# Patient Record
Sex: FEMALE | Race: WHITE | NOT HISPANIC OR LATINO | Employment: FULL TIME | URBAN - METROPOLITAN AREA
[De-identification: names, ages, dates, MRNs, and addresses within clinical notes are randomized per-mention and may not be internally consistent; named-entity substitution may affect disease eponyms.]

---

## 2017-01-01 ENCOUNTER — COMMUNICATION - HEALTHEAST (OUTPATIENT)
Dept: FAMILY MEDICINE | Facility: CLINIC | Age: 62
End: 2017-01-01

## 2017-01-01 DIAGNOSIS — Z88.9 MULTIPLE ALLERGIES: ICD-10-CM

## 2017-03-10 ENCOUNTER — OFFICE VISIT - HEALTHEAST (OUTPATIENT)
Dept: FAMILY MEDICINE | Facility: CLINIC | Age: 62
End: 2017-03-10

## 2017-03-10 DIAGNOSIS — R20.0 NUMBNESS: ICD-10-CM

## 2017-03-10 DIAGNOSIS — R04.0 LEFT-SIDED EPISTAXIS: ICD-10-CM

## 2017-03-10 DIAGNOSIS — M79.605 LEG PAIN, LEFT: ICD-10-CM

## 2017-04-08 ENCOUNTER — COMMUNICATION - HEALTHEAST (OUTPATIENT)
Dept: FAMILY MEDICINE | Facility: CLINIC | Age: 62
End: 2017-04-08

## 2017-04-08 DIAGNOSIS — Z88.9 MULTIPLE ALLERGIES: ICD-10-CM

## 2017-04-12 ENCOUNTER — COMMUNICATION - HEALTHEAST (OUTPATIENT)
Dept: FAMILY MEDICINE | Facility: CLINIC | Age: 62
End: 2017-04-12

## 2017-04-12 DIAGNOSIS — J45.909 ASTHMA: ICD-10-CM

## 2017-07-22 ENCOUNTER — COMMUNICATION - HEALTHEAST (OUTPATIENT)
Dept: FAMILY MEDICINE | Facility: CLINIC | Age: 62
End: 2017-07-22

## 2017-07-22 DIAGNOSIS — J45.909 ASTHMA: ICD-10-CM

## 2017-07-31 ENCOUNTER — RECORDS - HEALTHEAST (OUTPATIENT)
Dept: ADMINISTRATIVE | Facility: OTHER | Age: 62
End: 2017-07-31

## 2017-08-14 ENCOUNTER — COMMUNICATION - HEALTHEAST (OUTPATIENT)
Dept: FAMILY MEDICINE | Facility: CLINIC | Age: 62
End: 2017-08-14

## 2017-08-14 DIAGNOSIS — Z91.09 ENVIRONMENTAL ALLERGIES: ICD-10-CM

## 2017-09-24 ENCOUNTER — COMMUNICATION - HEALTHEAST (OUTPATIENT)
Dept: FAMILY MEDICINE | Facility: CLINIC | Age: 62
End: 2017-09-24

## 2017-09-24 DIAGNOSIS — J45.909 ASTHMA: ICD-10-CM

## 2017-09-30 ENCOUNTER — COMMUNICATION - HEALTHEAST (OUTPATIENT)
Dept: FAMILY MEDICINE | Facility: CLINIC | Age: 62
End: 2017-09-30

## 2017-09-30 DIAGNOSIS — Z88.9 MULTIPLE ALLERGIES: ICD-10-CM

## 2017-10-09 ENCOUNTER — COMMUNICATION - HEALTHEAST (OUTPATIENT)
Dept: FAMILY MEDICINE | Facility: CLINIC | Age: 62
End: 2017-10-09

## 2017-10-09 ENCOUNTER — HOSPITAL ENCOUNTER (OUTPATIENT)
Dept: MAMMOGRAPHY | Facility: CLINIC | Age: 62
Discharge: HOME | End: 2017-10-09
Attending: FAMILY MEDICINE

## 2017-10-09 DIAGNOSIS — Z12.31 VISIT FOR SCREENING MAMMOGRAM: ICD-10-CM

## 2017-11-10 ENCOUNTER — COMMUNICATION - HEALTHEAST (OUTPATIENT)
Dept: FAMILY MEDICINE | Facility: CLINIC | Age: 62
End: 2017-11-10

## 2017-11-10 DIAGNOSIS — Z91.09 ENVIRONMENTAL ALLERGIES: ICD-10-CM

## 2017-11-21 ENCOUNTER — COMMUNICATION - HEALTHEAST (OUTPATIENT)
Dept: FAMILY MEDICINE | Facility: CLINIC | Age: 62
End: 2017-11-21

## 2017-11-21 DIAGNOSIS — J45.909 ASTHMA: ICD-10-CM

## 2018-01-10 ENCOUNTER — OFFICE VISIT - HEALTHEAST (OUTPATIENT)
Dept: FAMILY MEDICINE | Facility: CLINIC | Age: 63
End: 2018-01-10

## 2018-01-10 DIAGNOSIS — J45.909 ASTHMA: ICD-10-CM

## 2018-01-10 DIAGNOSIS — R04.0 LEFT-SIDED EPISTAXIS: ICD-10-CM

## 2018-01-10 DIAGNOSIS — J31.0 RHINITIS: ICD-10-CM

## 2018-01-10 DIAGNOSIS — L40.9 PSORIASIS: ICD-10-CM

## 2018-01-10 DIAGNOSIS — Z00.00 PHYSICAL EXAM: ICD-10-CM

## 2018-01-10 DIAGNOSIS — E66.9 OBESITY: ICD-10-CM

## 2018-01-10 DIAGNOSIS — Z88.9 MULTIPLE ALLERGIES: ICD-10-CM

## 2018-01-10 DIAGNOSIS — Z91.09 ENVIRONMENTAL ALLERGIES: ICD-10-CM

## 2018-01-10 DIAGNOSIS — J45.30 MILD PERSISTENT ASTHMA: ICD-10-CM

## 2018-01-10 RX ORDER — CLOBETASOL PROPIONATE 0.5 MG/G
CREAM TOPICAL 2 TIMES DAILY
Status: SHIPPED | COMMUNITY
Start: 2018-01-10 | End: 2023-12-22

## 2018-01-10 RX ORDER — CALCIPOTRIENE 50 UG/G
CREAM TOPICAL 2 TIMES DAILY
Status: SHIPPED | COMMUNITY
Start: 2018-01-10 | End: 2023-12-22

## 2018-01-10 ASSESSMENT — MIFFLIN-ST. JEOR: SCORE: 1326.83

## 2018-02-15 ENCOUNTER — COMMUNICATION - HEALTHEAST (OUTPATIENT)
Dept: SCHEDULING | Facility: CLINIC | Age: 63
End: 2018-02-15

## 2018-02-15 ENCOUNTER — OFFICE VISIT - HEALTHEAST (OUTPATIENT)
Dept: FAMILY MEDICINE | Facility: CLINIC | Age: 63
End: 2018-02-15

## 2018-02-15 DIAGNOSIS — J18.9 PNEUMONIA: ICD-10-CM

## 2018-02-15 DIAGNOSIS — J45.30 MILD PERSISTENT ASTHMA WITHOUT COMPLICATION: ICD-10-CM

## 2018-02-15 LAB
FLUAV AG SPEC QL IA: NORMAL
FLUBV AG SPEC QL IA: NORMAL

## 2018-02-15 ASSESSMENT — MIFFLIN-ST. JEOR: SCORE: 1321.39

## 2018-02-19 ENCOUNTER — COMMUNICATION - HEALTHEAST (OUTPATIENT)
Dept: FAMILY MEDICINE | Facility: CLINIC | Age: 63
End: 2018-02-19

## 2018-02-22 ENCOUNTER — COMMUNICATION - HEALTHEAST (OUTPATIENT)
Dept: FAMILY MEDICINE | Facility: CLINIC | Age: 63
End: 2018-02-22

## 2018-05-15 ENCOUNTER — COMMUNICATION - HEALTHEAST (OUTPATIENT)
Dept: FAMILY MEDICINE | Facility: CLINIC | Age: 63
End: 2018-05-15

## 2018-05-15 DIAGNOSIS — Z91.09 ENVIRONMENTAL ALLERGIES: ICD-10-CM

## 2018-05-15 DIAGNOSIS — J45.909 ASTHMA: ICD-10-CM

## 2018-07-17 ENCOUNTER — OFFICE VISIT - HEALTHEAST (OUTPATIENT)
Dept: FAMILY MEDICINE | Facility: CLINIC | Age: 63
End: 2018-07-17

## 2018-07-17 DIAGNOSIS — Z12.11 SCREEN FOR COLON CANCER: ICD-10-CM

## 2018-07-17 DIAGNOSIS — J31.0 RHINITIS: ICD-10-CM

## 2018-07-17 DIAGNOSIS — J45.30 MILD PERSISTENT ASTHMA: ICD-10-CM

## 2018-07-17 DIAGNOSIS — R04.0 EPISTAXIS: ICD-10-CM

## 2018-07-17 DIAGNOSIS — H92.01 RIGHT EAR PAIN: ICD-10-CM

## 2018-08-06 ENCOUNTER — COMMUNICATION - HEALTHEAST (OUTPATIENT)
Dept: FAMILY MEDICINE | Facility: CLINIC | Age: 63
End: 2018-08-06

## 2018-08-06 ENCOUNTER — AMBULATORY - HEALTHEAST (OUTPATIENT)
Dept: FAMILY MEDICINE | Facility: CLINIC | Age: 63
End: 2018-08-06

## 2018-08-06 DIAGNOSIS — H92.01 RIGHT EAR PAIN: ICD-10-CM

## 2018-08-21 ENCOUNTER — OFFICE VISIT - HEALTHEAST (OUTPATIENT)
Dept: AUDIOLOGY | Facility: CLINIC | Age: 63
End: 2018-08-21

## 2018-08-21 ENCOUNTER — OFFICE VISIT - HEALTHEAST (OUTPATIENT)
Dept: OTOLARYNGOLOGY | Facility: CLINIC | Age: 63
End: 2018-08-21

## 2018-08-21 DIAGNOSIS — H93.8X1 SENSATION OF FULLNESS IN RIGHT EAR: ICD-10-CM

## 2018-08-21 DIAGNOSIS — H92.03 OTALGIA OF BOTH EARS: ICD-10-CM

## 2018-08-21 DIAGNOSIS — H92.01 OTALGIA, RIGHT: ICD-10-CM

## 2018-08-21 DIAGNOSIS — H93.13 TINNITUS OF BOTH EARS: ICD-10-CM

## 2018-10-03 ENCOUNTER — COMMUNICATION - HEALTHEAST (OUTPATIENT)
Dept: FAMILY MEDICINE | Facility: CLINIC | Age: 63
End: 2018-10-03

## 2018-10-03 ENCOUNTER — AMBULATORY - HEALTHEAST (OUTPATIENT)
Dept: NURSING | Facility: CLINIC | Age: 63
End: 2018-10-03

## 2018-10-05 ENCOUNTER — OFFICE VISIT - HEALTHEAST (OUTPATIENT)
Dept: FAMILY MEDICINE | Facility: CLINIC | Age: 63
End: 2018-10-05

## 2018-10-05 DIAGNOSIS — J45.30 MILD PERSISTENT ASTHMA WITHOUT COMPLICATION: ICD-10-CM

## 2018-11-08 ENCOUNTER — COMMUNICATION - HEALTHEAST (OUTPATIENT)
Dept: FAMILY MEDICINE | Facility: CLINIC | Age: 63
End: 2018-11-08

## 2018-11-14 ENCOUNTER — RECORDS - HEALTHEAST (OUTPATIENT)
Dept: ADMINISTRATIVE | Facility: OTHER | Age: 63
End: 2018-11-14

## 2018-12-05 ENCOUNTER — RECORDS - HEALTHEAST (OUTPATIENT)
Dept: ADMINISTRATIVE | Facility: OTHER | Age: 63
End: 2018-12-05

## 2018-12-06 ENCOUNTER — COMMUNICATION - HEALTHEAST (OUTPATIENT)
Dept: FAMILY MEDICINE | Facility: CLINIC | Age: 63
End: 2018-12-06

## 2018-12-06 DIAGNOSIS — Z23 NEED FOR VACCINATION: ICD-10-CM

## 2018-12-10 ENCOUNTER — AMBULATORY - HEALTHEAST (OUTPATIENT)
Dept: NURSING | Facility: CLINIC | Age: 63
End: 2018-12-10

## 2018-12-10 ENCOUNTER — HOSPITAL ENCOUNTER (OUTPATIENT)
Dept: MAMMOGRAPHY | Facility: CLINIC | Age: 63
Discharge: HOME | End: 2018-12-10
Attending: FAMILY MEDICINE

## 2018-12-10 DIAGNOSIS — Z12.31 VISIT FOR SCREENING MAMMOGRAM: ICD-10-CM

## 2018-12-10 DIAGNOSIS — Z23 NEED FOR VACCINATION: ICD-10-CM

## 2018-12-11 ENCOUNTER — COMMUNICATION - HEALTHEAST (OUTPATIENT)
Dept: FAMILY MEDICINE | Facility: CLINIC | Age: 63
End: 2018-12-11

## 2019-01-31 ENCOUNTER — COMMUNICATION - HEALTHEAST (OUTPATIENT)
Dept: FAMILY MEDICINE | Facility: CLINIC | Age: 64
End: 2019-01-31

## 2019-01-31 DIAGNOSIS — J45.909 ASTHMA: ICD-10-CM

## 2019-02-01 ENCOUNTER — COMMUNICATION - HEALTHEAST (OUTPATIENT)
Dept: FAMILY MEDICINE | Facility: CLINIC | Age: 64
End: 2019-02-01

## 2019-02-01 DIAGNOSIS — Z88.9 MULTIPLE ALLERGIES: ICD-10-CM

## 2019-02-06 ENCOUNTER — COMMUNICATION - HEALTHEAST (OUTPATIENT)
Dept: FAMILY MEDICINE | Facility: CLINIC | Age: 64
End: 2019-02-06

## 2019-02-06 DIAGNOSIS — Z23 ENCOUNTER FOR IMMUNIZATION: ICD-10-CM

## 2019-02-08 ENCOUNTER — AMBULATORY - HEALTHEAST (OUTPATIENT)
Dept: NURSING | Facility: CLINIC | Age: 64
End: 2019-02-08

## 2019-02-08 DIAGNOSIS — Z23 ENCOUNTER FOR IMMUNIZATION: ICD-10-CM

## 2019-02-11 ENCOUNTER — COMMUNICATION - HEALTHEAST (OUTPATIENT)
Dept: FAMILY MEDICINE | Facility: CLINIC | Age: 64
End: 2019-02-11

## 2019-02-11 DIAGNOSIS — Z91.09 ENVIRONMENTAL ALLERGIES: ICD-10-CM

## 2019-04-24 ENCOUNTER — OFFICE VISIT - HEALTHEAST (OUTPATIENT)
Dept: FAMILY MEDICINE | Facility: CLINIC | Age: 64
End: 2019-04-24

## 2019-04-24 DIAGNOSIS — J10.1 INFLUENZA A: ICD-10-CM

## 2019-04-24 DIAGNOSIS — J02.9 SORE THROAT: ICD-10-CM

## 2019-04-24 LAB
DEPRECATED S PYO AG THROAT QL EIA: NORMAL
FLUAV AG SPEC QL IA: ABNORMAL
FLUBV AG SPEC QL IA: ABNORMAL

## 2019-04-25 LAB — GROUP A STREP BY PCR: NORMAL

## 2019-05-17 ENCOUNTER — COMMUNICATION - HEALTHEAST (OUTPATIENT)
Dept: FAMILY MEDICINE | Facility: CLINIC | Age: 64
End: 2019-05-17

## 2019-05-17 DIAGNOSIS — Z91.09 ENVIRONMENTAL ALLERGIES: ICD-10-CM

## 2019-05-21 ENCOUNTER — COMMUNICATION - HEALTHEAST (OUTPATIENT)
Dept: FAMILY MEDICINE | Facility: CLINIC | Age: 64
End: 2019-05-21

## 2019-05-21 DIAGNOSIS — J10.1 INFLUENZA A: ICD-10-CM

## 2019-06-29 ENCOUNTER — COMMUNICATION - HEALTHEAST (OUTPATIENT)
Dept: FAMILY MEDICINE | Facility: CLINIC | Age: 64
End: 2019-06-29

## 2019-06-29 DIAGNOSIS — Z88.9 MULTIPLE ALLERGIES: ICD-10-CM

## 2019-07-29 ENCOUNTER — OFFICE VISIT - HEALTHEAST (OUTPATIENT)
Dept: FAMILY MEDICINE | Facility: CLINIC | Age: 64
End: 2019-07-29

## 2019-07-29 DIAGNOSIS — B35.1 ONYCHOMYCOSIS: ICD-10-CM

## 2019-07-29 DIAGNOSIS — H92.01 OTALGIA, RIGHT: ICD-10-CM

## 2019-08-11 ENCOUNTER — COMMUNICATION - HEALTHEAST (OUTPATIENT)
Dept: FAMILY MEDICINE | Facility: CLINIC | Age: 64
End: 2019-08-11

## 2019-08-11 DIAGNOSIS — Z91.09 ENVIRONMENTAL ALLERGIES: ICD-10-CM

## 2019-08-27 ENCOUNTER — COMMUNICATION - HEALTHEAST (OUTPATIENT)
Dept: FAMILY MEDICINE | Facility: CLINIC | Age: 64
End: 2019-08-27

## 2019-08-27 DIAGNOSIS — J45.909 ASTHMA: ICD-10-CM

## 2019-10-02 ENCOUNTER — OFFICE VISIT - HEALTHEAST (OUTPATIENT)
Dept: FAMILY MEDICINE | Facility: CLINIC | Age: 64
End: 2019-10-02

## 2019-10-02 DIAGNOSIS — J45.30 MILD PERSISTENT ASTHMA WITHOUT COMPLICATION: ICD-10-CM

## 2019-10-02 DIAGNOSIS — L40.9 PSORIASIS: ICD-10-CM

## 2019-10-02 DIAGNOSIS — J45.909 ASTHMA: ICD-10-CM

## 2019-10-02 DIAGNOSIS — H92.01 OTALGIA, RIGHT: ICD-10-CM

## 2019-10-02 DIAGNOSIS — Z13.5 SCREENING FOR EYE CONDITION: ICD-10-CM

## 2019-10-02 DIAGNOSIS — J30.1 ALLERGIC RHINITIS DUE TO POLLEN, UNSPECIFIED SEASONALITY: ICD-10-CM

## 2019-10-02 DIAGNOSIS — Z71.85 VACCINE COUNSELING: ICD-10-CM

## 2019-10-14 ENCOUNTER — COMMUNICATION - HEALTHEAST (OUTPATIENT)
Dept: FAMILY MEDICINE | Facility: CLINIC | Age: 64
End: 2019-10-14

## 2019-10-14 DIAGNOSIS — J45.30 MILD PERSISTENT ASTHMA WITHOUT COMPLICATION: ICD-10-CM

## 2019-11-11 ENCOUNTER — COMMUNICATION - HEALTHEAST (OUTPATIENT)
Dept: FAMILY MEDICINE | Facility: CLINIC | Age: 64
End: 2019-11-11

## 2019-11-11 ENCOUNTER — OFFICE VISIT - HEALTHEAST (OUTPATIENT)
Dept: FAMILY MEDICINE | Facility: CLINIC | Age: 64
End: 2019-11-11

## 2019-11-11 DIAGNOSIS — Z91.09 ENVIRONMENTAL ALLERGIES: ICD-10-CM

## 2019-11-11 DIAGNOSIS — J18.9 PNEUMONIA OF BOTH LUNGS DUE TO INFECTIOUS ORGANISM, UNSPECIFIED PART OF LUNG: ICD-10-CM

## 2019-11-11 DIAGNOSIS — R05.9 COUGH: ICD-10-CM

## 2019-11-11 DIAGNOSIS — L40.9 PSORIASIS: ICD-10-CM

## 2019-11-11 DIAGNOSIS — J30.1 ALLERGIC RHINITIS DUE TO POLLEN, UNSPECIFIED SEASONALITY: ICD-10-CM

## 2019-11-13 ENCOUNTER — OFFICE VISIT - HEALTHEAST (OUTPATIENT)
Dept: FAMILY MEDICINE | Facility: CLINIC | Age: 64
End: 2019-11-13

## 2019-11-13 DIAGNOSIS — J18.9 PNEUMONIA DUE TO INFECTIOUS ORGANISM, UNSPECIFIED LATERALITY, UNSPECIFIED PART OF LUNG: ICD-10-CM

## 2019-11-20 ENCOUNTER — RECORDS - HEALTHEAST (OUTPATIENT)
Dept: ADMINISTRATIVE | Facility: OTHER | Age: 64
End: 2019-11-20

## 2019-11-29 ENCOUNTER — COMMUNICATION - HEALTHEAST (OUTPATIENT)
Dept: FAMILY MEDICINE | Facility: CLINIC | Age: 64
End: 2019-11-29

## 2019-11-29 DIAGNOSIS — Z88.9 MULTIPLE ALLERGIES: ICD-10-CM

## 2019-11-29 DIAGNOSIS — H92.01 OTALGIA, RIGHT: ICD-10-CM

## 2019-12-12 ENCOUNTER — HOSPITAL ENCOUNTER (OUTPATIENT)
Dept: MAMMOGRAPHY | Facility: CLINIC | Age: 64
Discharge: HOME | End: 2019-12-12
Attending: FAMILY MEDICINE

## 2019-12-12 DIAGNOSIS — Z12.31 VISIT FOR SCREENING MAMMOGRAM: ICD-10-CM

## 2019-12-13 ENCOUNTER — COMMUNICATION - HEALTHEAST (OUTPATIENT)
Dept: FAMILY MEDICINE | Facility: CLINIC | Age: 64
End: 2019-12-13

## 2019-12-16 ENCOUNTER — COMMUNICATION - HEALTHEAST (OUTPATIENT)
Dept: FAMILY MEDICINE | Facility: CLINIC | Age: 64
End: 2019-12-16

## 2020-02-16 ENCOUNTER — COMMUNICATION - HEALTHEAST (OUTPATIENT)
Dept: FAMILY MEDICINE | Facility: CLINIC | Age: 65
End: 2020-02-16

## 2020-02-16 DIAGNOSIS — Z91.09 ENVIRONMENTAL ALLERGIES: ICD-10-CM

## 2020-03-18 ENCOUNTER — COMMUNICATION - HEALTHEAST (OUTPATIENT)
Dept: FAMILY MEDICINE | Facility: CLINIC | Age: 65
End: 2020-03-18

## 2020-03-23 ENCOUNTER — COMMUNICATION - HEALTHEAST (OUTPATIENT)
Dept: ALLERGY | Facility: CLINIC | Age: 65
End: 2020-03-23

## 2020-04-13 ENCOUNTER — COMMUNICATION - HEALTHEAST (OUTPATIENT)
Dept: FAMILY MEDICINE | Facility: CLINIC | Age: 65
End: 2020-04-13

## 2020-04-13 DIAGNOSIS — J45.909 ASTHMA: ICD-10-CM

## 2020-04-17 ENCOUNTER — COMMUNICATION - HEALTHEAST (OUTPATIENT)
Dept: FAMILY MEDICINE | Facility: CLINIC | Age: 65
End: 2020-04-17

## 2020-04-17 DIAGNOSIS — Z88.9 MULTIPLE ALLERGIES: ICD-10-CM

## 2020-05-19 ENCOUNTER — COMMUNICATION - HEALTHEAST (OUTPATIENT)
Dept: FAMILY MEDICINE | Facility: CLINIC | Age: 65
End: 2020-05-19

## 2020-05-19 DIAGNOSIS — Z91.09 ENVIRONMENTAL ALLERGIES: ICD-10-CM

## 2020-07-16 ENCOUNTER — COMMUNICATION - HEALTHEAST (OUTPATIENT)
Dept: FAMILY MEDICINE | Facility: CLINIC | Age: 65
End: 2020-07-16

## 2020-07-16 DIAGNOSIS — J30.1 ALLERGIC RHINITIS DUE TO POLLEN, UNSPECIFIED SEASONALITY: ICD-10-CM

## 2020-08-18 ENCOUNTER — COMMUNICATION - HEALTHEAST (OUTPATIENT)
Dept: FAMILY MEDICINE | Facility: CLINIC | Age: 65
End: 2020-08-18

## 2020-08-18 DIAGNOSIS — Z91.09 ENVIRONMENTAL ALLERGIES: ICD-10-CM

## 2020-09-20 ENCOUNTER — COMMUNICATION - HEALTHEAST (OUTPATIENT)
Dept: FAMILY MEDICINE | Facility: CLINIC | Age: 65
End: 2020-09-20

## 2020-09-20 DIAGNOSIS — J45.909 ASTHMA: ICD-10-CM

## 2020-09-20 DIAGNOSIS — Z88.9 MULTIPLE ALLERGIES: ICD-10-CM

## 2020-09-21 ENCOUNTER — OFFICE VISIT - HEALTHEAST (OUTPATIENT)
Dept: FAMILY MEDICINE | Facility: CLINIC | Age: 65
End: 2020-09-21

## 2020-09-21 DIAGNOSIS — J30.1 ALLERGIC RHINITIS DUE TO POLLEN, UNSPECIFIED SEASONALITY: ICD-10-CM

## 2020-09-21 DIAGNOSIS — Z23 NEED FOR VACCINATION: ICD-10-CM

## 2020-09-21 DIAGNOSIS — Z13.220 SCREENING FOR LIPID DISORDERS: ICD-10-CM

## 2020-09-21 DIAGNOSIS — J45.30 MILD PERSISTENT ASTHMA WITHOUT COMPLICATION: ICD-10-CM

## 2020-09-21 DIAGNOSIS — Z00.00 WELLNESS EXAMINATION: ICD-10-CM

## 2020-09-21 DIAGNOSIS — L40.9 PSORIASIS: ICD-10-CM

## 2020-09-21 ASSESSMENT — MIFFLIN-ST. JEOR: SCORE: 1352.36

## 2020-09-22 ENCOUNTER — COMMUNICATION - HEALTHEAST (OUTPATIENT)
Dept: FAMILY MEDICINE | Facility: CLINIC | Age: 65
End: 2020-09-22

## 2020-10-05 LAB
CHOLEST SERPL-MCNC: 217 MG/DL
FASTING STATUS PATIENT QL REPORTED: YES
FASTING STATUS PATIENT QL REPORTED: YES
GLUCOSE BLD-MCNC: 88 MG/DL (ref 70–125)
HDLC SERPL-MCNC: 82 MG/DL
LDLC SERPL CALC-MCNC: 118 MG/DL
TRIGL SERPL-MCNC: 84 MG/DL

## 2020-11-13 ENCOUNTER — COMMUNICATION - HEALTHEAST (OUTPATIENT)
Dept: FAMILY MEDICINE | Facility: CLINIC | Age: 65
End: 2020-11-13

## 2020-11-13 DIAGNOSIS — Z91.09 ENVIRONMENTAL ALLERGIES: ICD-10-CM

## 2020-11-17 ENCOUNTER — COMMUNICATION - HEALTHEAST (OUTPATIENT)
Dept: FAMILY MEDICINE | Facility: CLINIC | Age: 65
End: 2020-11-17

## 2020-11-17 DIAGNOSIS — Z91.09 ENVIRONMENTAL ALLERGIES: ICD-10-CM

## 2021-01-14 ENCOUNTER — COMMUNICATION - HEALTHEAST (OUTPATIENT)
Dept: FAMILY MEDICINE | Facility: CLINIC | Age: 66
End: 2021-01-14

## 2021-02-16 ENCOUNTER — COMMUNICATION - HEALTHEAST (OUTPATIENT)
Dept: FAMILY MEDICINE | Facility: CLINIC | Age: 66
End: 2021-02-16

## 2021-02-16 DIAGNOSIS — Z91.09 ENVIRONMENTAL ALLERGIES: ICD-10-CM

## 2021-02-18 ENCOUNTER — HOSPITAL ENCOUNTER (OUTPATIENT)
Dept: MAMMOGRAPHY | Facility: CLINIC | Age: 66
Discharge: HOME | End: 2021-02-18
Attending: FAMILY MEDICINE

## 2021-02-18 DIAGNOSIS — Z12.31 VISIT FOR SCREENING MAMMOGRAM: ICD-10-CM

## 2021-02-19 ENCOUNTER — COMMUNICATION - HEALTHEAST (OUTPATIENT)
Dept: FAMILY MEDICINE | Facility: CLINIC | Age: 66
End: 2021-02-19

## 2021-02-28 ENCOUNTER — COMMUNICATION - HEALTHEAST (OUTPATIENT)
Dept: FAMILY MEDICINE | Facility: CLINIC | Age: 66
End: 2021-02-28

## 2021-03-01 ENCOUNTER — COMMUNICATION - HEALTHEAST (OUTPATIENT)
Dept: FAMILY MEDICINE | Facility: CLINIC | Age: 66
End: 2021-03-01

## 2021-03-02 ENCOUNTER — COMMUNICATION - HEALTHEAST (OUTPATIENT)
Dept: FAMILY MEDICINE | Facility: CLINIC | Age: 66
End: 2021-03-02

## 2021-03-04 ENCOUNTER — COMMUNICATION - HEALTHEAST (OUTPATIENT)
Dept: FAMILY MEDICINE | Facility: CLINIC | Age: 66
End: 2021-03-04

## 2021-03-05 ENCOUNTER — COMMUNICATION - HEALTHEAST (OUTPATIENT)
Dept: FAMILY MEDICINE | Facility: CLINIC | Age: 66
End: 2021-03-05

## 2021-03-05 DIAGNOSIS — Z88.9 MULTIPLE ALLERGIES: ICD-10-CM

## 2021-04-18 ENCOUNTER — COMMUNICATION - HEALTHEAST (OUTPATIENT)
Dept: FAMILY MEDICINE | Facility: CLINIC | Age: 66
End: 2021-04-18

## 2021-04-18 DIAGNOSIS — Z88.9 MULTIPLE ALLERGIES: ICD-10-CM

## 2021-04-18 DIAGNOSIS — Z91.09 ENVIRONMENTAL ALLERGIES: ICD-10-CM

## 2021-04-18 DIAGNOSIS — J30.1 ALLERGIC RHINITIS DUE TO POLLEN, UNSPECIFIED SEASONALITY: ICD-10-CM

## 2021-04-19 ENCOUNTER — COMMUNICATION - HEALTHEAST (OUTPATIENT)
Dept: FAMILY MEDICINE | Facility: CLINIC | Age: 66
End: 2021-04-19

## 2021-04-19 ENCOUNTER — AMBULATORY - HEALTHEAST (OUTPATIENT)
Dept: FAMILY MEDICINE | Facility: CLINIC | Age: 66
End: 2021-04-19

## 2021-04-19 DIAGNOSIS — Z88.9 MULTIPLE ALLERGIES: ICD-10-CM

## 2021-04-19 DIAGNOSIS — J45.909 ASTHMA: ICD-10-CM

## 2021-04-19 RX ORDER — ALBUTEROL SULFATE 90 UG/1
AEROSOL, METERED RESPIRATORY (INHALATION)
Qty: 18 G | Refills: 5 | Status: SHIPPED | OUTPATIENT
Start: 2021-04-19 | End: 2021-10-25

## 2021-04-19 RX ORDER — FEXOFENADINE HCL AND PSEUDOEPHEDRINE HCI 60; 120 MG/1; MG/1
TABLET, EXTENDED RELEASE ORAL
Qty: 180 TABLET | Refills: 0 | Status: SHIPPED | OUTPATIENT
Start: 2021-04-19 | End: 2021-10-18

## 2021-05-17 ENCOUNTER — COMMUNICATION - HEALTHEAST (OUTPATIENT)
Dept: FAMILY MEDICINE | Facility: CLINIC | Age: 66
End: 2021-05-17

## 2021-05-17 DIAGNOSIS — Z91.09 ENVIRONMENTAL ALLERGIES: ICD-10-CM

## 2021-05-18 RX ORDER — MONTELUKAST SODIUM 10 MG/1
10 TABLET ORAL DAILY
Qty: 90 TABLET | Refills: 1 | Status: SHIPPED | OUTPATIENT
Start: 2021-05-18 | End: 2021-11-09

## 2021-05-28 ENCOUNTER — RECORDS - HEALTHEAST (OUTPATIENT)
Dept: ADMINISTRATIVE | Facility: CLINIC | Age: 66
End: 2021-05-28

## 2021-05-28 ASSESSMENT — ASTHMA QUESTIONNAIRES
ACT_TOTALSCORE: 14
ACT_TOTALSCORE: 24

## 2021-05-28 NOTE — TELEPHONE ENCOUNTER
RN cannot approve Refill Request    RN can NOT refill this medication med is not covered by policy/route to provider. Last office visit: 10/5/2018 Tahir Ruiz MD Last Physical: 1/10/2018 Last MTM visit: Visit date not found Last visit same specialty: 10/5/2018 Tahir Ruiz MD.  Next visit within 3 mo: Visit date not found  Next physical within 3 mo: Visit date not found      Renuka Baumann, [facility] Triage/Med Refill 5/17/2019    Requested Prescriptions   Pending Prescriptions Disp Refills     montelukast (SINGULAIR) 10 mg tablet [Pharmacy Med Name: MONTELUKAST 10MG TABLETS] 90 tablet 0     Sig: TAKE 1 TABLET(10 MG) BY MOUTH DAILY       There is no refill protocol information for this order

## 2021-05-28 NOTE — PATIENT INSTRUCTIONS - HE
Your rapid influenza test came back positive for flu. You are contagious until your fever is gone for 24 hours. Maintain good hand hygiene, cover your cough, and limit contact to prevent spreading the illness. Symptoms typically last 1-2 weeks.    Symptom management:  - Drink plenty of fluids and allow for plenty of rest  - Use tylenol or ibuprofen every 4-6 hours for fever/discomfort  - Take antivirals as prescribed  - Use Flonase nasal spray and Sudafed to help with sinus and ear congestion    Reasons to be seen immediately for re-evaluation:  - Have trouble breathing or are short of breath  - Feel pain or pressure in your chest or belly  - Get suddenly dizzy  - Feel confused  - Have severe vomiting    If no symptom improvement in 1 week, follow-up with your primary care provider.

## 2021-05-28 NOTE — PROGRESS NOTES
Assessment:       Influenza A.       Plan:       Tamiflu.  Sudafed.  Flonase nasal spray.  Fluids, rest.  OTC analgesics discussed.  Discussed signs of worsening infection and when to follow-up with PCP if no symptom improvement.      Patient Instructions   Your rapid influenza test came back positive for flu. You are contagious until your fever is gone for 24 hours. Maintain good hand hygiene, cover your cough, and limit contact to prevent spreading the illness. Symptoms typically last 1-2 weeks.    Symptom management:  - Drink plenty of fluids and allow for plenty of rest  - Use tylenol or ibuprofen every 4-6 hours for fever/discomfort  - Take antivirals as prescribed  - Use Flonase nasal spray and Sudafed to help with sinus and ear congestion    Reasons to be seen immediately for re-evaluation:  - Have trouble breathing or are short of breath  - Feel pain or pressure in your chest or belly  - Get suddenly dizzy  - Feel confused  - Have severe vomiting    If no symptom improvement in 1 week, follow-up with your primary care provider.      Subjective:       History provided by patient. She is here with her .  Radha Cooper is a 63 y.o. female who presents for evaluation of influenza like symptoms. Symptoms include sore throat, rhinorrhea, headaches, temperature of 99.8 max, and cough with mild shortness of breath. These symptoms have been present for 3 days. She has tried to alleviate the symptoms with her albuterol inhaler, cough drops, and allegra with moderate relief. High risk factors for influenza complications: history of asthma. Patient did get her influenza vaccine this year.    The following portions of the patient's history were reviewed and updated as appropriate: allergies, current medications and problem list.    Review of Systems  Pertinent items are noted in HPI.     Allergies  Allergies   Allergen Reactions     Penicillins Rash     rash--in childhood       Shellfish Containing Products Other  (See Comments)     Abdominal pain            Objective:       /77   Pulse 99   Temp 99.8  F (37.7  C) (Oral)   Resp 16   Wt 179 lb 4.8 oz (81.3 kg)   LMP 10/09/2000   SpO2 95%   BMI 31.51 kg/m    General appearance: alert, appears stated age, cooperative, fatigued, no distress and non-toxic  Head: Normocephalic, without obvious abnormality, atraumatic, sinuses nontender to percussion  Ears: TM's intact wih mucoid fluid and mild bulging, no erythema; external ears normal  Nose: no discharge  Throat: lips, mucosa, and tongue normal; teeth and gums normal  Neck: no adenopathy and supple, symmetrical, trachea midline  Lungs: clear to auscultation bilaterally and no rhonchi, rales, or wheezing  Heart: regular rate and rhythm, S1, S2 normal, no murmur, click, rub or gallop    Lab Results    Recent Results (from the past 24 hour(s))   Rapid Strep A Screen-Throat   Result Value Ref Range    Rapid Strep A Antigen No Group A Strep detected, presumptive negative No Group A Strep detected, presumptive negative   Influenza A/B Rapid Test   Result Value Ref Range    Influenza  A, Rapid Antigen Influenza A antigen detected (!) No Influenza A antigen detected    Influenza B, Rapid Antigen No Influenza B antigen detected No Influenza B antigen detected     I personally reviewed these results and discussed findings with the patient.

## 2021-05-29 ENCOUNTER — RECORDS - HEALTHEAST (OUTPATIENT)
Dept: ADMINISTRATIVE | Facility: CLINIC | Age: 66
End: 2021-05-29

## 2021-05-29 NOTE — TELEPHONE ENCOUNTER
RN cannot approve Refill Request    RN can NOT refill this medication rx sig is for 7 days.       Ethel Ley, [facility] Triage/Med Refill 5/22/2019    Requested Prescriptions   Pending Prescriptions Disp Refills     fluticasone propionate (FLONASE) 50 mcg/actuation nasal spray [Pharmacy Med Name: FLUTICASONE PROP 50 MCG SPRAY]  0     Sig: SPRAY 2 SPRAYS INTO EACH NOSTRIL EVERY DAY FOR 7 DAYS       Nasal Steroid Refill Protocol Passed - 5/21/2019  1:31 AM        Passed - Patient has had office visit/physical in last 2 years     Last office visit with prescriber/PCP: 4/24/2019 OR same dept: 4/24/2019 Katie Hess PA-C OR same specialty: 4/24/2019 Katie Hess PA-C Last physical: Visit date not found Last MTM visit: Visit date not found    Next appt within 3 mo: Visit date not found  Next physical within 3 mo: Visit date not found  Prescriber OR PCP: Katie Hess PA-C  Last diagnosis associated with med order: 1. Influenza A  - fluticasone propionate (FLONASE) 50 mcg/actuation nasal spray [Pharmacy Med Name: FLUTICASONE PROP 50 MCG SPRAY]; SPRAY 2 SPRAYS INTO EACH NOSTRIL EVERY DAY FOR 7 DAYS; Refill: 0     If protocol passes may refill for 12 months if within 3 months of last provider visit (or a total of 15 months).

## 2021-05-30 ENCOUNTER — RECORDS - HEALTHEAST (OUTPATIENT)
Dept: ADMINISTRATIVE | Facility: CLINIC | Age: 66
End: 2021-05-30

## 2021-05-30 VITALS — BODY MASS INDEX: 30.69 KG/M2 | WEIGHT: 176 LBS

## 2021-05-30 NOTE — TELEPHONE ENCOUNTER
RN cannot approve Refill Request    RN can NOT refill this medication med is not covered by policy/route to provider. Last office visit: 10/5/2018 Tahir Ruiz MD Last Physical: 1/10/2018 Last MTM visit: Visit date not found Last visit same specialty: 10/5/2018 Tahir Ruiz MD.  Next visit within 3 mo: Visit date not found  Next physical within 3 mo: Visit date not found      Tracy Gaona, [facility] Triage/Med Refill 6/29/2019    Requested Prescriptions   Pending Prescriptions Disp Refills     fexofenadine-pseudoephedrine (ALLEGRA-D 12 HOUR)  mg per tablet [Pharmacy Med Name: ALLEGRA-D 12HR TABLETS 30'S (OTC)] 180 tablet 0     Sig: TAKE 1 TABLET BY MOUTH TWICE DAILY       There is no refill protocol information for this order

## 2021-05-30 NOTE — PROGRESS NOTES
"Assessment/Plan:     1. Otalgia, right  The right ear canal was flushed with warm water.  A small amount of debris and dry skin was flushed out.  Patient verbalized improvement of symptoms.  She may use her steroid cream to the flaky area at the entrance of the ear canal.  Recommend restarting Flonase nasal spray for inner ear fullness, as this is likely related to her allergies.  Ultimately, if symptoms do not improve, I would recommend ENT follow up.  - fluticasone propionate (FLONASE) 50 mcg/actuation nasal spray; 2 sprays into each nostril daily.  Dispense: 16 g; Refill: 0    2. Onychomycosis  This is not bothersome to the patient.  I would not recommend treatment, as it is often not successful.      Subjective:     Radha Cooper is a 64 y.o. female who presents with complaints of right ear discomfort.  States it feels \"squishy\" and like there is fluid in it.  She has had some yellow drainage when she cleans her ear canal with a Q-tip.  Denies any pain or itching.  She does have psoriasis.  She does feel that her hearing is not quite as good.  Patient denies any bleeding large amount of serous drainage from the ear.  She did see ENT last year for similar symptoms, and they suggested possibly placing a tube for drainage.  Patient does not want to have this done.  She did try some Debrox at home.  She does have a history of asthma and allergies and is taking Singulair and Allegra-D.    Patient has a thickened and discolored left great toenail.  She was told by a dermatologist that this was a toenail fungus.  They did not recommend treatment.  Patient is wondering if she needs to do anything for this.  It is not painful or terribly bothersome.      The following portions of the patient's history were reviewed and updated as appropriate: allergies, current medications.    Review of Systems  A comprehensive review of systems was performed and was otherwise negative    Objective:     /68   Pulse 78   Wt 178 " lb 14.4 oz (81.1 kg)   LMP 10/09/2000   BMI 31.44 kg/m      General Appearance: Alert, cooperative, no distress, appears stated age  Ears: Left ear canal and TM normal. Right TM appears cloudy; intact. Mild skin flaking at the entrance of the ear canal.   Post irrigation: Right TM normal  Skin: Left great toenail is thickened and discolored.      Jacqueline James, NP

## 2021-05-31 VITALS — HEIGHT: 63 IN | WEIGHT: 179.3 LBS | BODY MASS INDEX: 31.77 KG/M2

## 2021-05-31 VITALS — HEIGHT: 63 IN | WEIGHT: 180.5 LBS | BODY MASS INDEX: 31.98 KG/M2

## 2021-05-31 NOTE — TELEPHONE ENCOUNTER
Refill Approved    Rx renewed per Medication Renewal Policy. Medication was last renewed on 2/3/2019.    Andrzej Manzano, [facility] Triage/Med Refill 8/29/2019     Requested Prescriptions   Pending Prescriptions Disp Refills     albuterol (PROAIR HFA;PROVENTIL HFA;VENTOLIN HFA) 90 mcg/actuation inhaler [Pharmacy Med Name: ALBUTEROL HFA INH (200 PUFFS) 18GM] 18 g 0     Sig: INHALE 1 TO 2 PUFFS BY MOUTH EVERY 4- 6 HOURS AS NEEDED       Albuterol/Levalbuterol Refill Protocol Passed - 8/27/2019  7:39 PM        Passed - PCP or prescribing provider visit in last year     Last office visit with prescriber/PCP: 10/5/2018 Tahir Ruiz MD OR same dept: 7/29/2019 Jacqueline James NP OR same specialty: 7/29/2019 Jacqueline James NP Last physical: 1/10/2018       Next appt within 3 mo: Visit date not found  Next physical within 3 mo: Visit date not found  Prescriber OR PCP: Tahir Ruiz MD  Last diagnosis associated with med order: 1. Asthma  - albuterol (PROAIR HFA;PROVENTIL HFA;VENTOLIN HFA) 90 mcg/actuation inhaler [Pharmacy Med Name: ALBUTEROL HFA INH (200 PUFFS) 18GM]; INHALE 1 TO 2 PUFFS BY MOUTH EVERY 4- 6 HOURS AS NEEDED  Dispense: 18 g; Refill: 0    If protocol passes may refill for 6 months if within 3 months of last provider visit (or a total of 9 months). If patient requesting >1 inhaler per month refill x 6 months and have patient make appointment with provider.

## 2021-05-31 NOTE — TELEPHONE ENCOUNTER
RN cannot approve Refill Request    RN can NOT refill this medication Protocol failed and NO refill given. Last office visit: 10/5/2018 Tahir Ruiz MD Last Physical: 1/10/2018 Last MTM visit: Visit date not found Last visit same specialty: 7/29/2019 Jacqueline James NP.  Next visit within 3 mo: Visit date not found  Next physical within 3 mo: Visit date not found      Milvia Cornell, [facility] Triage/Med Refill 8/11/2019    Requested Prescriptions   Pending Prescriptions Disp Refills     montelukast (SINGULAIR) 10 mg tablet [Pharmacy Med Name: MONTELUKAST 10MG TABLETS] 90 tablet 0     Sig: TAKE 1 TABLET(10 MG) BY MOUTH DAILY       There is no refill protocol information for this order

## 2021-06-01 VITALS — BODY MASS INDEX: 31.63 KG/M2 | WEIGHT: 180 LBS

## 2021-06-01 NOTE — PROGRESS NOTES
ASSESSMENT:  1. Mild persistent asthma   Asthma is currently very well controlled ACT score of 23.  - albuterol (VENTOLIN HFA) 90 mcg/actuation inhaler; INHALE 1 TO 2 PUFFS BY MOUTH EVERY 4 TO 6 HOURS AS NEEDED  Dispense: 18 g; Refill: 0    2. Psoriasis  Followed by dermatology.  - Ambulatory referral to Dermatology    3. Allergic rhinitis  Is a trigger for her asthma, controlled with Flonase and OTC antihistamines.  - fluticasone propionate (FLONASE) 50 mcg/actuation nasal spray; 2 sprays into each nostril daily.  Dispense: 16 g; Refill: 0    4. 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  - Ambulatory referral to Ophthalmology     5. Vaccine counseling     - Influenza High Dose, Seasonal 65+ yrs        PLAN:  1.  High-dose influenza.  2.  Referral to both [facility].  3.  I did renew the patient's medications as above.  4.  Patient otherwise should be seen yearly.    Orders Placed This Encounter   Procedures     Influenza High Dose, Seasonal 65+ yrs     Ambulatory referral to Dermatology     Referral Priority:   Routine     Referral Type:   Consultation     Referral Reason:   Evaluation and Treatment     Requested Specialty:   Dermatology     Number of Visits Requested:   1     Ambulatory referral to Ophthalmology     Referral Priority:   Routine     Referral Type:   Consultation     Referral Reason:   Evaluation and Treatment     Requested Specialty:   Ophthalmology     Number of Visits Requested:   1     Medications Discontinued During This Encounter   Medication Reason     norethindrone-ethinyl estradiol (LOESTRIN FE 1/20, 28-DAY,) 1 mg-20 mcg (21)/75 mg (7) per tablet Duplicate order     VENTOLIN HFA 90 mcg/actuation inhaler Reorder     fluticasone propionate (FLONASE) 50 mcg/actuation nasal spray Reorder       Return in about 3 months (around 1/2/2020) for Annual physical.    CHIEF COMPLAINT:  Chief Complaint   Patient  presents with     Medication Management     Referral       SUBJECTIVE:  Radha is a 64 y.o. female presenting to the clinic for asthma medication management. She says her Flonase helps her congestion and her Flovent 44 helps manage her asthma. Symptoms have not been an issue over the summer but are worsening with the season change. Air quality is not good at work but good at home.    Patient has a left eye droop and artificial lens in her right eye. She needs new glasses and sees [facility].    Patient reports psoriasis in her right ear and treats it with Eucrisa. She also has a mole on her back, small erythematous area on her right leg, and fungus on her left great toenail. She would like a referral [facility] regarding her psoriasis.    REVIEW OF SYSTEMS:   All other systems are negative.    PFSH:  Patient's  recently had a grapefruit sized meningioma removed. He fully recovered but is more emotional.  Patient and  are considering moving to [place].    Immunization History   Administered Date(s) Administered     DT (pediatric) 01/01/1996     Influenza [id number], 12/07/2009     Influenza high dose,seasonal,PF, 65+ yrs 10/02/2019     Influenza, Seasonal, Inj PF IIV3 09/16/2010     Influenza, inj, historic,unspecified 10/13/2007, 10/13/2008, 09/14/2009, 10/15/2014     Influenza,seasonal, Inj IIV3 10/22/2004, 10/07/2005, 10/12/2006, 09/20/2011, 09/11/2012     Influenza,seasonal,quad inj =/> 6months 09/21/2015, 10/05/2018     Pneumo Conj 7-V(before 2010) 11/07/2014     Pneumo Polysac 23-V 11/04/2009     Td,adult,historic,unspecified 04/10/2006     Tdap 04/10/2006, 09/28/2016     ZOSTER, LIVE 09/28/2016     ZOSTER, RECOMBINANT, IM 12/10/2018, 02/08/2019     Social History     Socioeconomic History     Marital status:      Spouse name: Not on file     Number of children: 0     Years of education: Not on file     Highest education level: Not on file   Occupational History      Occupation: [employer]   Social Needs     Financial resource strain: Not on file     Food insecurity:     Worry: Not on file     Inability: Not on file     Transportation needs:     Medical: Not on file     Non-medical: Not on file   Tobacco Use     Smoking status: Former Smoker     Packs/day: 1.00     Years: 20.00     Pack years: 20.00     Types: Cigarettes     Smokeless tobacco: Never Used     Tobacco comment: Quit mid-90s   Substance and Sexual Activity     Alcohol use: Yes     Alcohol/week: 7.0 standard drinks     Types: 7 Glasses of wine per week     Drug use: No     Sexual activity: Yes     Partners: Male   Lifestyle     Physical activity:     Days per week: Not on file     Minutes per session: Not on file     Stress: Not on file   Relationships     Social connections:     Talks on phone: Not on file     Gets together: Not on file     Attends Hindu service: Not on file     Active member of club or organization: Not on file     Attends meetings of clubs or organizations: Not on file     Relationship status: Not on file     Intimate partner violence:     Fear of current or ex partner: Not on file     Emotionally abused: Not on file     Physically abused: Not on file     Forced sexual activity: Not on file   Other Topics Concern     Not on file   Social History Narrative    Diet- Regular, fish, chicken.        Exercise- treadmill, walk daily, body classes         -meningioma     Past Medical History:   Diagnosis Date     Shingles     Right leg, Right low back     Family History   Problem Relation Age of Onset     Ovarian cancer Maternal Aunt 60        Mother's half sister     Breast cancer Cousin 40     Heart attack Mother 62     CABG Mother         80s     Hypertension Mother      Asthma Father      Asthma Sister      Hypertension Sister      Asthma Sister      Hypertension Sister      Lupus Sister        MEDICATIONS:  Current Outpatient Medications   Medication Sig Dispense Refill      albuterol (PROAIR HFA;PROVENTIL HFA;VENTOLIN HFA) 90 mcg/actuation inhaler INHALE 1 TO 2 PUFFS BY MOUTH EVERY 4- 6 HOURS AS NEEDED 18 g 5     albuterol (VENTOLIN HFA) 90 mcg/actuation inhaler INHALE 1 TO 2 PUFFS BY MOUTH EVERY 4 TO 6 HOURS AS NEEDED 18 g 0     calcipotriene (DOVONOX) 0.005 % cream Apply topically 2 (two) times a day.       clobetasol (TEMOVATE) 0.05 % cream Apply topically 2 (two) times a day.       crisaborole (EUCRISA) 2 % Oint Apply topically 2 (two) times a day.       fexofenadine-pseudoephedrine (ALLEGRA-D 12 HOUR)  mg per tablet TAKE 1 TABLET BY MOUTH TWICE DAILY 180 tablet 0     fluticasone (FLOVENT HFA) 44 mcg/actuation inhaler Inhale 1 puff 2 (two) times a day. 1 Inhaler 11     fluticasone propionate (FLONASE) 50 mcg/actuation nasal spray 2 sprays into each nostril daily. 16 g 0     montelukast (SINGULAIR) 10 mg tablet TAKE 1 TABLET(10 MG) BY MOUTH DAILY 90 tablet 0     VIVELLE-DOT 0.05 mg/24 hr        X-VIATE 40 % Crea        No current facility-administered medications for this visit.        TOBACCO USE:  Social History     Tobacco Use   Smoking Status Former Smoker     Packs/day: 1.00     Years: 20.00     Pack years: 20.00     Types: Cigarettes   Smokeless Tobacco Never Used   Tobacco Comment    Quit mid-90s       VITALS:  Vitals:    10/02/19 0951   BP: 124/68   Pulse: 78   SpO2: 98%   Weight: 181 lb 9.6 oz (82.4 kg)     Wt Readings from Last 3 Encounters:   10/02/19 181 lb 9.6 oz (82.4 kg)   07/29/19 178 lb 14.4 oz (81.1 kg)   04/24/19 179 lb 4.8 oz (81.3 kg)       PHYSICAL EXAM:  Constitutional:   Reveals an alert patient in no acute distress.  Vitals: per nursing notes.  Lungs: Good air entry bilaterally.  Respiratory effort normal.  Neuro:  Alert and oriented. Cranial nerves, motor, sensory exams are intact.  No gross focal deficits.  Psychiatric:  Memory intact, mood appropriate.    DATA REVIEWED:  Additional History from Old Records Summarized (2): None  Decision to Obtain  Records (1): None  Radiology Tests Summarized or Ordered (1): None  Labs Reviewed or Ordered (1): None  Medicine Test Summarized or Ordered (1): None  Independent Review of EKG, X-RAY, or RAPID STREP (2 each): None    The visit lasted a total of 10 minutes face to face with the patient. Over 50% of the time was spent counseling and educating the patient about asthma and psoriasis .    IPancho, am scribing for and in the presence of, Dr. Ruiz.    I, Dr. Ruiz, personally performed the services described in this documentation, as scribed by Pancho Leiva in my presence, and it is both accurate and complete.    Total data points: 0

## 2021-06-02 ENCOUNTER — RECORDS - HEALTHEAST (OUTPATIENT)
Dept: ADMINISTRATIVE | Facility: CLINIC | Age: 66
End: 2021-06-02

## 2021-06-02 VITALS — BODY MASS INDEX: 31.51 KG/M2 | WEIGHT: 179.3 LBS

## 2021-06-03 VITALS — SYSTOLIC BLOOD PRESSURE: 114 MMHG | OXYGEN SATURATION: 98 % | DIASTOLIC BLOOD PRESSURE: 74 MMHG | HEART RATE: 81 BPM

## 2021-06-03 VITALS
BODY MASS INDEX: 32.44 KG/M2 | OXYGEN SATURATION: 98 % | DIASTOLIC BLOOD PRESSURE: 81 MMHG | TEMPERATURE: 98.2 F | RESPIRATION RATE: 20 BRPM | SYSTOLIC BLOOD PRESSURE: 137 MMHG | WEIGHT: 184.56 LBS | HEART RATE: 103 BPM

## 2021-06-03 VITALS
BODY MASS INDEX: 31.92 KG/M2 | SYSTOLIC BLOOD PRESSURE: 124 MMHG | HEART RATE: 78 BPM | OXYGEN SATURATION: 98 % | DIASTOLIC BLOOD PRESSURE: 68 MMHG | WEIGHT: 181.6 LBS

## 2021-06-03 VITALS — WEIGHT: 178.9 LBS | BODY MASS INDEX: 31.44 KG/M2

## 2021-06-03 NOTE — PROGRESS NOTES
Chief Complaint   Patient presents with     Cough     X 1 Week        HPI:  Radha Cooper is a 64 y.o. female with past medical history of asthma, psoriasis, and allergic rhinitis who presents today complaining of cough x 1 week.  Patient reports low-grade fevers of 99.1.  She has been using her albuterol inhaler more frequently.  She has been experiencing some intermittent bilateral ear discomfort.  She is had psoriasis develop in the right ear.  Her symptoms began with throat pain and postnasal drip.  The pain in the throat has somewhat resolved, but she has significant hoarseness.    History obtained from the patient.    Problem List:  2018-01: Psoriasis  Mild persistent asthma  Obesity  Rhinitis  Allergic Rhinitis  Asthma With Acute Exacerbation      Past Medical History:   Diagnosis Date     Shingles     Right leg, Right low back       Social History     Tobacco Use     Smoking status: Former Smoker     Packs/day: 1.00     Years: 20.00     Pack years: 20.00     Types: Cigarettes     Smokeless tobacco: Never Used     Tobacco comment: Quit mid-90s   Substance Use Topics     Alcohol use: Yes     Alcohol/week: 7.0 standard drinks     Types: 7 Glasses of wine per week       Review of Systems   Constitutional: Positive for fatigue and fever.   HENT: Positive for congestion, ear pain, rhinorrhea, sore throat (Resolved) and voice change.    Respiratory: Positive for cough and wheezing.    Gastrointestinal: Negative.        Vitals:    11/11/19 1501   BP: 137/81   Patient Site: Right Arm   Patient Position: Sitting   Cuff Size: Adult Regular   Pulse: (!) 103   Resp: 20   Temp: 98.2  F (36.8  C)   TempSrc: Oral   SpO2: 98%   Weight: 184 lb 9 oz (83.7 kg)       Physical Exam  Vitals signs and nursing note reviewed.   Constitutional:       General: She is not in acute distress.     Appearance: She is well-developed. She is not diaphoretic.   HENT:      Head: Normocephalic and atraumatic.      Comments: Right ear canal  scaling and erythema.     Left Ear: Tympanic membrane, ear canal and external ear normal.   Eyes:      General:         Right eye: No discharge.         Left eye: No discharge.      Conjunctiva/sclera: Conjunctivae normal.   Cardiovascular:      Rate and Rhythm: Normal rate and regular rhythm.      Heart sounds: Normal heart sounds.   Pulmonary:      Effort: Pulmonary effort is normal. No respiratory distress.      Breath sounds: Wheezing (mild diffuse) present.   Neurological:      Mental Status: She is alert.   Psychiatric:         Behavior: Behavior normal.         Thought Content: Thought content normal.         Judgement: Judgment normal.       Radiology:  Xr Chest 2 Views    Result Date: 11/11/2019  EXAM: XR CHEST 2 VIEWS LOCATION: [facility] DATE/TIME: 11/11/2019 3:29 PM INDICATION: cough, wheezing, hx of pneumonia causing hospitalizations. Low grade fever COMPARISON: Chest x-ray 02/15/2018     Minimal lingular opacities which may reflect atelectasis or infiltrates. The right lung is clear. No pleural effusion. Normal heart size.      Clinical Decision Making:  Chest x-rays shows a small lingular opacity on the left lung.  Patient started on azithromycin for treatment today.  Although the patient is high risk for resistance I recommend single therapy to start out and close follow-up, as her symptoms are currently mild and early.  Psoriasis is noted in her right ear canal.  Recommend follow-up with ENT for further evaluation and treatment.  At the end of the encounter, I discussed results, diagnosis, medications. Discussed red flags for immediate return to clinic/ER, as well as indications for follow up if no improvement. Patient understood and agreed to plan. Patient was stable for discharge.    1. Pneumonia of both lungs due to infectious organism, unspecified part of lung  azithromycin (ZITHROMAX) 250 MG tablet   2. Cough  XR Chest 2 Views   3. Psoriasis  Ambulatory referral to ENT          Patient Instructions     Patient Education     Treating Pneumonia  Pneumonia is an infection of one or both of the lungs. Pneumonia:    Is usually caused by either a virus or a bacteria    Can be very serious, especially in infants, young children, and older adults. It s also serious for those with other long-term health problems or weakened immune systems.    Is sometimes treated at home and sometimes in the hospital    Antibiotic medicines  Antibiotics may be prescribed for pneumonia caused by bacteria. They may be pills (oral medicines), or shots (injections). Or they may be given by IV (intravenously) into a vein. If you are taking oral medicines at home:    Fill your prescription and start taking your medicine as soon as you can.    You will likely start to feel better in a day or 2, but don t stop taking the antibiotic.    Use a pill organizer to help you remember to take your medicine.    Let your healthcare provider know if you have side effects.    Take your medicine exactly as directed on the label. Talk to your provider or pharmacist if you have any questions.  Antiviral medicines  Antiviral medicine may be prescribed for pneumonia caused by a virus. For example, antiviral medicine may be prescribed for pneumonia caused by the flu virus. Antibiotics do not work against viruses. If you are taking antiviral medicine at home:    Fill your prescription and start taking your medicine as soon as you can.    Talk with your provider or pharmacist about possible side effects.    Take the medicine exactly as instructed.  To relieve symptoms  There are many medicines that can help relieve symptoms of pneumonia. Some are prescription and some are over-the-counter.  Your healthcare provider may recommend:    Acetaminophen or ibuprofen to lower your fever and to lessen headache or other pain    Cough medicine to loosen mucus or to reduce coughing  Make sure you check with your healthcare provider or pharmacist  before taking any over-the-counter medicines.  Special treatments  If you are hospitalized for pneumonia, you may have other therapies, including:    Inhaled medicines to help with breathing or chest congestion    Supplemental oxygen to increase low oxygen levels  Drink fluids and eat healthy  You should eat healthy to help your body fight the infection. Drinking a lot of fluids helps to replace fluids lost from fever and to loosen mucus in your chest.    Diet. Make healthy food choices, including fruits and vegetables, lean meats and other proteins, 100% whole grain and low- or no-fat dairy products.    Fluids. Drink at least 6 to 8 tall glasses a day. Water and 100% fruit or vegetable juice are best.  Get plenty of rest and sleep  You may be more tired than usual for a while. It is important to get enough sleep at night. It s also important to rest during the day. Talk with your healthcare provider if coughing or other symptoms are interfering with your sleep.  Preventing the spread of germs  The best thing you can do to prevent spreading germs is to wash your hands often. You should:    Rub your hand with soap and water for 20 to 30 seconds.    Clean in between your fingers, the backs of your hands, and around your nails.    Dry your hands on a separate towel or use paper towels.  You should also:    Keep alcohol-based hand  nearby.    Make sure you also clean surfaces that you touch. Use a product that kills all types of germs.    Stay away from others until you are feeling better.  When to call your healthcare provider  Call your healthcare provider if you have any of the following:    Symptoms get worse    Fever continues    Shortness of breath gets worse    Increased mucus or mucus that is darker in color    Coughing gets worse    Lips or fingers are bluish in color    Side effects from your medicine   Date Last Reviewed: 12/1/2016 2000-2019 [employer]. [address] [facility],  [place] [address]. All rights reserved. This information is not intended as a substitute for professional medical care. Always follow your healthcare professional's instructions.

## 2021-06-03 NOTE — TELEPHONE ENCOUNTER
Refill Approved    Rx renewed per Medication Renewal Policy. Medication was last renewed on 8/12/19.    Deborah Garcia, [facility] Triage/Med Refill 11/12/2019     Requested Prescriptions   Pending Prescriptions Disp Refills     montelukast (SINGULAIR) 10 mg tablet [Pharmacy Med Name: MONTELUKAST 10MG TABLETS] 90 tablet 0     Sig: TAKE 1 TABLET BY MOUTH DAILY       There is no refill protocol information for this order        fluticasone propionate (FLONASE) 50 mcg/actuation nasal spray [Pharmacy Med Name: FLUTICASONE 50MCG NASAL SP (120) RX]  0     Sig: SHAKE LIQUID AND USE 2 SPRAYS IN EACH NOSTRIL DAILY       Nasal Steroid Refill Protocol Passed - 11/11/2019  5:40 AM        Passed - Patient has had office visit/physical in last 2 years     Last office visit with prescriber/PCP: 10/2/2019 OR same dept: 10/2/2019 Tahir Ruiz MD OR same specialty: 10/2/2019 Tahir Ruiz MD Last physical: 1/10/2018 Last MTM visit: Visit date not found    Next appt within 3 mo: Visit date not found  Next physical within 3 mo: Visit date not found  Prescriber OR PCP: Tahir Ruiz MD  Last diagnosis associated with med order: 1. Environmental allergies  - montelukast (SINGULAIR) 10 mg tablet [Pharmacy Med Name: MONTELUKAST 10MG TABLETS]; TAKE 1 TABLET BY MOUTH DAILY  Dispense: 90 tablet; Refill: 0    2. Allergic rhinitis due to pollen, unspecified seasonality  - fluticasone propionate (FLONASE) 50 mcg/actuation nasal spray [Pharmacy Med Name: FLUTICASONE 50MCG NASAL SP (120) RX]; SHAKE LIQUID AND USE 2 SPRAYS IN EACH NOSTRIL DAILY; Refill: 0     If protocol passes may refill for 12 months if within 3 months of last provider visit (or a total of 15 months).

## 2021-06-03 NOTE — PATIENT INSTRUCTIONS - HE
Patient Education     Treating Pneumonia  Pneumonia is an infection of one or both of the lungs. Pneumonia:    Is usually caused by either a virus or a bacteria    Can be very serious, especially in infants, young children, and older adults. It s also serious for those with other long-term health problems or weakened immune systems.    Is sometimes treated at home and sometimes in the hospital    Antibiotic medicines  Antibiotics may be prescribed for pneumonia caused by bacteria. They may be pills (oral medicines), or shots (injections). Or they may be given by IV (intravenously) into a vein. If you are taking oral medicines at home:    Fill your prescription and start taking your medicine as soon as you can.    You will likely start to feel better in a day or 2, but don t stop taking the antibiotic.    Use a pill organizer to help you remember to take your medicine.    Let your healthcare provider know if you have side effects.    Take your medicine exactly as directed on the label. Talk to your provider or pharmacist if you have any questions.  Antiviral medicines  Antiviral medicine may be prescribed for pneumonia caused by a virus. For example, antiviral medicine may be prescribed for pneumonia caused by the flu virus. Antibiotics do not work against viruses. If you are taking antiviral medicine at home:    Fill your prescription and start taking your medicine as soon as you can.    Talk with your provider or pharmacist about possible side effects.    Take the medicine exactly as instructed.  To relieve symptoms  There are many medicines that can help relieve symptoms of pneumonia. Some are prescription and some are over-the-counter.  Your healthcare provider may recommend:    Acetaminophen or ibuprofen to lower your fever and to lessen headache or other pain    Cough medicine to loosen mucus or to reduce coughing  Make sure you check with your healthcare provider or pharmacist before taking any  over-the-counter medicines.  Special treatments  If you are hospitalized for pneumonia, you may have other therapies, including:    Inhaled medicines to help with breathing or chest congestion    Supplemental oxygen to increase low oxygen levels  Drink fluids and eat healthy  You should eat healthy to help your body fight the infection. Drinking a lot of fluids helps to replace fluids lost from fever and to loosen mucus in your chest.    Diet. Make healthy food choices, including fruits and vegetables, lean meats and other proteins, 100% whole grain and low- or no-fat dairy products.    Fluids. Drink at least 6 to 8 tall glasses a day. Water and 100% fruit or vegetable juice are best.  Get plenty of rest and sleep  You may be more tired than usual for a while. It is important to get enough sleep at night. It s also important to rest during the day. Talk with your healthcare provider if coughing or other symptoms are interfering with your sleep.  Preventing the spread of germs  The best thing you can do to prevent spreading germs is to wash your hands often. You should:    Rub your hand with soap and water for 20 to 30 seconds.    Clean in between your fingers, the backs of your hands, and around your nails.    Dry your hands on a separate towel or use paper towels.  You should also:    Keep alcohol-based hand  nearby.    Make sure you also clean surfaces that you touch. Use a product that kills all types of germs.    Stay away from others until you are feeling better.  When to call your healthcare provider  Call your healthcare provider if you have any of the following:    Symptoms get worse    Fever continues    Shortness of breath gets worse    Increased mucus or mucus that is darker in color    Coughing gets worse    Lips or fingers are bluish in color    Side effects from your medicine   Date Last Reviewed: 12/1/2016 2000-2019 [employer]. [address] [place] [address], [place] [address]. All  rights reserved. This information is not intended as a substitute for professional medical care. Always follow your healthcare professional's instructions.

## 2021-06-03 NOTE — TELEPHONE ENCOUNTER
RN cannot approve Refill Request    RN can NOT refill this medication med is not covered by policy/route to provider. Last office visit: 10/2/2019 Tahir Ruiz MD Last Physical: 1/10/2018 Last MTM visit: Visit date not found Last visit same specialty: 10/2/2019 Tahir Ruiz MD.  Next visit within 3 mo: Visit date not found  Next physical within 3 mo: Visit date not found      Deborah Garcia, [facility] Triage/Med Refill 11/12/2019    Requested Prescriptions   Pending Prescriptions Disp Refills     montelukast (SINGULAIR) 10 mg tablet [Pharmacy Med Name: MONTELUKAST 10MG TABLETS] 90 tablet 0     Sig: TAKE 1 TABLET BY MOUTH DAILY       There is no refill protocol information for this order      Signed Prescriptions Disp Refills    fluticasone propionate (FLONASE) 50 mcg/actuation nasal spray 16 g 3     Sig: SHAKE LIQUID AND USE 2 SPRAYS IN EACH NOSTRIL DAILY       Nasal Steroid Refill Protocol Passed - 11/11/2019  5:40 AM        Passed - Patient has had office visit/physical in last 2 years     Last office visit with prescriber/PCP: 10/2/2019 OR same dept: 10/2/2019 Tahir Ruiz MD OR same specialty: 10/2/2019 Tahir Ruiz MD Last physical: 1/10/2018 Last MTM visit: Visit date not found    Next appt within 3 mo: Visit date not found  Next physical within 3 mo: Visit date not found  Prescriber OR PCP: Tahir Ruiz MD  Last diagnosis associated with med order: 1. Environmental allergies  - montelukast (SINGULAIR) 10 mg tablet [Pharmacy Med Name: MONTELUKAST 10MG TABLETS]; TAKE 1 TABLET BY MOUTH DAILY  Dispense: 90 tablet; Refill: 0    2. Allergic rhinitis due to pollen, unspecified seasonality  - fluticasone propionate (FLONASE) 50 mcg/actuation nasal spray; SHAKE LIQUID AND USE 2 SPRAYS IN EACH NOSTRIL DAILY  Dispense: 16 g; Refill: 3     If protocol passes may refill for 12 months if within 3 months of last provider visit (or a total of 15 months).

## 2021-06-03 NOTE — TELEPHONE ENCOUNTER
Controlled Substance Refill Request  Medication:   Requested Prescriptions     Pending Prescriptions Disp Refills     fexofenadine-pseudoephedrine (ALLEGRA-D 12 HOUR)  mg per tablet [Pharmacy Med Name: ALLEGRA-D 12HR TABLETS 30'S (OTC)] 180 tablet 0     Sig: TAKE 1 TABLET BY MOUTH TWICE DAILY     Date Last Fill: 7/1/19  Pharmacy: [id number]   Submit electronically to pharmacy  Controlled Substance Agreement on File:   Encounter-Level CSA Scan Date:    There are no encounter-level csa scan date.       Last office visit: Last office visit pertaining to requested medication was 11/13/19.

## 2021-06-03 NOTE — PROGRESS NOTES
ASSESSMENT:  1. Pneumonia  Diagnosed 2 days ago, by symptoms and radiographic findings, patient has had slight improvement.        PLAN:  1.  Patient to finish out the Zithromax as prescribed.  2.  Encourage patient to be aggressive about the use of her inhalers every several hours as needed.  3.  I discussed with the patient that I anticipate a will be several weeks before she has full recovery.  4.  Follow-up as needed.    No orders of the defined types were placed in this encounter.    Medications Discontinued During This Encounter   Medication Reason     albuterol (PROAIR HFA;PROVENTIL HFA;VENTOLIN HFA) 90 mcg/actuation inhaler Duplicate order       No follow-ups on file.    CHIEF COMPLAINT:  Chief Complaint   Patient presents with     Follow-up     from [facility] for pnuemonia        SUBJECTIVE:  Radha is a 64 y.o. female presenting to the clinic today to follow up on her pneumonia.    She started coughing about 9 days ago. On 11/11, she went to the [facility] in [facility] where she was diagnosed with pneumonia and received a prescription for azithromycin. She says her cough today is not as bad as it was two days ago. She is able to breath through her nose now. She was able to sleep last night without waking up coughing.   She says some people at her workplace were sick before her cough started.       REVIEW OF SYSTEMS:   +Cough  +Congestion  All other systems are negative.    PFSH:  Immunization History   Administered Date(s) Administered     DT (pediatric) 01/01/1996     Influenza [id number], 12/07/2009     Influenza high dose,seasonal,PF, 65+ yrs 10/02/2019     Influenza, Seasonal, Inj PF IIV3 09/16/2010     Influenza, inj, historic,unspecified 10/13/2007, 10/13/2008, 09/14/2009, 10/15/2014     Influenza,seasonal, Inj IIV3 10/22/2004, 10/07/2005, 10/12/2006, 09/20/2011, 09/11/2012     Influenza,seasonal,quad inj =/> 6months 09/21/2015, 10/05/2018     Pneumo Conj 7-V(before 2010) 11/07/2014     Pneumo  Polysac 23-V 11/04/2009     Td,adult,historic,unspecified 04/10/2006     Tdap 04/10/2006, 09/28/2016     ZOSTER, LIVE 09/28/2016     ZOSTER, RECOMBINANT, IM 12/10/2018, 02/08/2019     Social History     Socioeconomic History     Marital status:      Spouse name: Not on file     Number of children: 0     Years of education: Not on file     Highest education level: Not on file   Occupational History     Occupation: [employer]   Social Needs     Financial resource strain: Not on file     Food insecurity:     Worry: Not on file     Inability: Not on file     Transportation needs:     Medical: Not on file     Non-medical: Not on file   Tobacco Use     Smoking status: Former Smoker     Packs/day: 1.00     Years: 20.00     Pack years: 20.00     Types: Cigarettes     Smokeless tobacco: Never Used     Tobacco comment: Quit mid-90s   Substance and Sexual Activity     Alcohol use: Yes     Alcohol/week: 7.0 standard drinks     Types: 7 Glasses of wine per week     Drug use: No     Sexual activity: Yes     Partners: Male   Lifestyle     Physical activity:     Days per week: Not on file     Minutes per session: Not on file     Stress: Not on file   Relationships     Social connections:     Talks on phone: Not on file     Gets together: Not on file     Attends Shinto service: Not on file     Active member of club or organization: Not on file     Attends meetings of clubs or organizations: Not on file     Relationship status: Not on file     Intimate partner violence:     Fear of current or ex partner: Not on file     Emotionally abused: Not on file     Physically abused: Not on file     Forced sexual activity: Not on file   Other Topics Concern     Not on file   Social History Narrative    Diet- Regular, fish, chicken.        Exercise- treadmill, walk daily, body classes         -meningioma     Past Medical History:   Diagnosis Date     Shingles     Right leg, Right low back     Family History   Problem  Relation Age of Onset     Ovarian cancer Maternal Aunt 60        Mother's half sister     Breast cancer Cousin 40     Heart attack Mother 62     CABG Mother         80s     Hypertension Mother      Asthma Father      Asthma Sister      Hypertension Sister      Asthma Sister      Hypertension Sister      Lupus Sister        MEDICATIONS:  Current Outpatient Medications   Medication Sig Dispense Refill     albuterol (PROAIR HFA;PROVENTIL HFA;VENTOLIN HFA) 90 mcg/actuation inhaler INHALE 1 TO 2 PUFFS BY MOUTH EVERY 4- 6 HOURS AS NEEDED 18 g 5     azithromycin (ZITHROMAX) 250 MG tablet Take 2 tablets daily x 1 day, then 1 tablet daily x 4 days 6 tablet 0     calcipotriene (DOVONOX) 0.005 % cream Apply topically 2 (two) times a day.       clobetasol (TEMOVATE) 0.05 % cream Apply topically 2 (two) times a day.       crisaborole (EUCRISA) 2 % Oint Apply topically 2 (two) times a day.       fexofenadine-pseudoephedrine (ALLEGRA-D 12 HOUR)  mg per tablet TAKE 1 TABLET BY MOUTH TWICE DAILY 180 tablet 0     fluticasone (FLOVENT HFA) 44 mcg/actuation inhaler Inhale 1 puff 2 (two) times a day. 1 Inhaler 11     fluticasone propionate (FLONASE) 50 mcg/actuation nasal spray SHAKE LIQUID AND USE 2 SPRAYS IN EACH NOSTRIL DAILY 16 g 3     montelukast (SINGULAIR) 10 mg tablet TAKE 1 TABLET BY MOUTH DAILY 90 tablet 0     VIVELLE-DOT 0.05 mg/24 hr        X-VIATE 40 % Crea        No current facility-administered medications for this visit.        TOBACCO USE:  Social History     Tobacco Use   Smoking Status Former Smoker     Packs/day: 1.00     Years: 20.00     Pack years: 20.00     Types: Cigarettes   Smokeless Tobacco Never Used   Tobacco Comment    Quit mid-90s       VITALS:  Vitals:    11/13/19 0813   BP: 114/74   Pulse: 81   SpO2: 98%     Wt Readings from Last 3 Encounters:   11/11/19 184 lb 9 oz (83.7 kg)   10/02/19 181 lb 9.6 oz (82.4 kg)   07/29/19 178 lb 14.4 oz (81.1 kg)       PHYSICAL EXAM:  Constitutional:   Reveals a ill  appearing, somewhat congested sounding woman.    Vitals: per nursing notes.  Head: Atraumatic, Normocephalic  Nose: No significant mucous noted.  Ears:  External canals, TMs clear.    Eyes:  EOMs full, PERRL.  Lungs: Clear to A&P without rales or wheezes.  Respiratory effort normal.  Cardiac:   Regular rate and rhythm, normal S1, S2, no murmur or gallop.  Musculoskeletal: No peripheral swelling.  Neuro:  Alert and oriented. Cranial nerves, motor, sensory exams are intact.  No gross focal deficits.  Psychiatric:  Memory intact, mood appropriate.    DATA REVIEWED:  Additional History from Old Records Summarized (2): Reviewed note from the [facility] on 11/11/2019. She was diagnosed with pneumonia and given azithromycin.  Decision to Obtain Records (1): None.  Radiology Tests Summarized or Ordered (1): None.  Labs Reviewed or Ordered (1): None.  Medicine Test Summarized or Ordered (1): None.  Independent Review of EKG, X-RAY, or RAPID STREP (2 each): None.    The visit lasted a total of 8 minutes face to face with the patient. Over 50% of the time was spent counseling and educating the patient about pneumonia.    I, Baldemar John, am scribing for and in the presence of, Dr. Ruiz.    I, Dr. Ruiz, personally performed the services described in this documentation, as scribed by Baldemar John in my presence, and it is both accurate and complete.    Total data points: 2

## 2021-06-04 VITALS — HEIGHT: 63 IN | WEIGHT: 187 LBS | BODY MASS INDEX: 33.13 KG/M2

## 2021-06-07 NOTE — TELEPHONE ENCOUNTER
Refill Approved    Rx renewed per Medication Renewal Policy. Medication was last renewed on 8/29/19.    Ethel Ley, [facility] Triage/Med Refill 4/14/2020     Requested Prescriptions   Pending Prescriptions Disp Refills     albuterol (PROAIR HFA;PROVENTIL HFA;VENTOLIN HFA) 90 mcg/actuation inhaler [Pharmacy Med Name: ALBUTEROL HFA INH (200 PUFFS) 18GM] 18 g 5     Sig: INHALE 1- 2 PUFFS BY MOUTH EVERY 4- 6 HOURS AS NEEDED       Albuterol/Levalbuterol Refill Protocol Passed - 4/13/2020  1:32 PM        Passed - PCP or prescribing provider visit in last year     Last office visit with prescriber/PCP: 11/13/2019 Tahir Ruiz MD OR same dept: 11/13/2019 Tahir Ruiz MD OR same specialty: 11/13/2019 Tahir Ruiz MD Last physical: 1/10/2018       Next appt within 3 mo: Visit date not found  Next physical within 3 mo: Visit date not found  Prescriber OR PCP: Tahir Ruiz MD  Last diagnosis associated with med order: 1. Asthma  - albuterol (PROAIR HFA;PROVENTIL HFA;VENTOLIN HFA) 90 mcg/actuation inhaler [Pharmacy Med Name: ALBUTEROL HFA INH (200 PUFFS) 18GM]; INHALE 1- 2 PUFFS BY MOUTH EVERY 4- 6 HOURS AS NEEDED  Dispense: 18 g; Refill: 5    If protocol passes may refill for 6 months if within 3 months of last provider visit (or a total of 9 months). If patient requesting >1 inhaler per month refill x 6 months and have patient make appointment with provider.

## 2021-06-07 NOTE — TELEPHONE ENCOUNTER
Dr. Wheatley    This person called and is requesting a call back:    Name Of Person Who Called: Patient    Why Did The Person Call: Patient saw Dr Wheatley 2014. She was given a nebulizer and is wondering if she can still use the old viles if she gets shortness of breath/tightness in chest    Best Phone Number To Call Back: [phone]    Okay To Leave A Detailed Voicemail? Yes    Thank you.

## 2021-06-07 NOTE — TELEPHONE ENCOUNTER
She will to call her primary as I cannot give advice to someone I haven't seen in 6 years---    Routing comment      Called and informed patient to contact primary doctor

## 2021-06-08 NOTE — TELEPHONE ENCOUNTER
RN cannot approve Refill Request    RN can NOT refill this medication med is not covered by policy/route to provider. Last office visit: 11/13/2019 Tahir Ruiz MD Last Physical: 1/10/2018 Last MTM visit: Visit date not found Last visit same specialty: 11/13/2019 Tahir Ruiz MD.  Next visit within 3 mo: Visit date not found  Next physical within 3 mo: Visit date not found      Paulette Garcia, [facility] Triage/Med Refill 5/19/2020    Requested Prescriptions   Pending Prescriptions Disp Refills     montelukast (SINGULAIR) 10 mg tablet [Pharmacy Med Name: MONTELUKAST 10MG TABLETS] 90 tablet 0     Sig: TAKE 1 TABLET BY MOUTH DAILY       There is no refill protocol information for this order

## 2021-06-09 NOTE — PROGRESS NOTES
ASSESSMENT:  1. Leg pain, left  6 months of left leg pain, there could be ITP band syndrome or other musculoskeletal issues are certainly this could be sciatica.    2. Left-sided epistaxis  Patient has had some bleeding left-sided, probably related to decreased humidity    3. Numbness  There is an area in the right temple area that's been numb for quite some time, I suspect a local nerve irritation I'm not suspicious for more serious neurologic issue      PLAN:  1.  In terms of left leg pain B discuss options, MRI of the lumbar spine physical therapy.  For now she is coming get some inserts to her feet to see if this doesn't help.  2.  Use Vaseline in the left nostril  3.  Reassurance about the numbness I wouldn't pursue any further workup unless there are change or progression       No orders of the defined types were placed in this encounter.    Medications Discontinued During This Encounter   Medication Reason     codeine-guaiFENesin (GUAIFENESIN AC)  mg/5 mL liquid Therapy completed     fluticasone (FLOVENT HFA) 44 mcg/actuation inhaler Therapy completed     ketoconazole (NIZORAL) 2 % cream Therapy completed     PATADAY 0.2 % Drop Therapy completed       No Follow-up on file.    CHIEF COMPLAINT:  Chief Complaint   Patient presents with     Leg Pain     ache in left leg, pain radiates from hip to ankle x 1 month     Epistaxis     left side only the past few months o/o     Ear Pain     rigth side- ? related to shingles in the past few months- slight discomfort        SUBJECTIVE:  Radha is a 61 y.o. female comes in with a number of concerns.  Patient reports that she has had about 6 months of left leg pain is sometimes numbing and tingling to see the back of the side of the leg was already down to the ankle sometimes though not consistent weakness.  Gradually got worse.  She wonders if there is some type of leg length discrepancy since she's had some musculoskeletal issues on the right side.  We discussed  potential further workup versus conservative intervention of the like after discussions she would simply like to put some inserts and her feet to see if that doesn't help.    For several months she's had some intermittent nose bleeding always on the left significant congestion.    She has an area of numbness in the right temple area just been present for some time has not changed, I told the patient I'm not suspicious for any neurologic issue    REVIEW OF SYSTEMS:   No other change in her health status when she does report work stress.  All other systems are negative.    PFSH:  Immunization History   Administered Date(s) Administered     DT (pediatric) 01/01/1996     Influenza [id number], 12/07/2009     Influenza, Seasonal, Inj PF 09/16/2010     Influenza, inj, historic 10/13/2007, 10/13/2008, 09/14/2009, 10/15/2014     Influenza, seasonal,quad inj 36+ mos 09/21/2015     Pneumo Conj 7-V(before 2010) 11/07/2014     Pneumo Polysac 23-V 11/04/2009     Td, historic 04/10/2006     Tdap 04/10/2006, 09/28/2016     ZOSTER 09/28/2016     Social History     Social History     Marital status:      Spouse name: N/A     Number of children: 0     Years of education: N/A     Occupational History     [employer]      Social History Main Topics     Smoking status: Former Smoker     Packs/day: 1.00     Years: 20.00     Types: Cigarettes     Smokeless tobacco: Not on file      Comment: Quit mid-90s     Alcohol use 4.2 oz/week     7 Glasses of wine per week     Drug use: Not on file     Sexual activity: Not on file     Other Topics Concern     Not on file     Social History Narrative    Diet- Regular, fish, chicken.        Exercise- treadmill, walk daily, body classes      Past Medical History:   Diagnosis Date     Shingles     Right leg, Right low back     Family History   Problem Relation Age of Onset     Ovarian cancer Maternal Aunt 60     Mother's half sister     Breast cancer Cousin 40     Heart attack Mother 62      CABG Mother      80s     Hypertension Mother      Asthma Father      Asthma Sister      Hypertension Sister      Asthma Sister      Hypertension Sister      Lupus Sister        MEDICATIONS:  Current Outpatient Prescriptions   Medication Sig Dispense Refill     ALLEGRA-D 12 HOUR  mg per tablet TAKE 1 TABLET BY MOUTH TWICE DAILY 180 tablet 0     montelukast (SINGULAIR) 10 mg tablet TAKE ONE TABLET BY MOUTH DAILY 90 tablet 3     VENTOLIN HFA 90 mcg/actuation inhaler INHALE 1 TO 2 PUFFS BY MOUTH EVERY 4 TO 6 HOURS AS NEEDED 18 g 0     VIVELLE-DOT 0.05 mg/24 hr        X-VIATE 40 % Crea        No current facility-administered medications for this visit.        TOBACCO USE:  History   Smoking Status     Former Smoker     Packs/day: 1.00     Years: 20.00     Types: Cigarettes   Smokeless Tobacco     Not on file     Comment: Quit mid-90s       VITALS:  Vitals:    03/10/17 1554   BP: 110/80   Pulse: 80   Weight: 176 lb (79.8 kg)     Wt Readings from Last 3 Encounters:   03/10/17 176 lb (79.8 kg)   09/28/16 178 lb (80.7 kg)   04/18/16 178 lb 14.4 oz (81.1 kg)       PHYSICAL EXAM:  Constitutional:   Reveals a female who appears healthy.  Vitals: per nursing notes.  HEENT:  Ears:  External canals, TMs clear.  Vision area in the left septum that looks like there's been some recent bleeding but no active bleeding.  Eyes:  EOMs full, PERRL.  Lungs: Clear to A&P without rales or wheezes.  Respiratory effort normal.  Cardiac:   Regular rate and rhythm, normal S1, S2, no murmur or gallop.  Musculoskeletal: No peripheral swelling.  Neuro:  Alert and oriented. Cranial nerves, motor, sensory exams are intact.  No gross focal deficits.  Psychiatric:  Memory intact, mood appropriate.    QUALITY MEASURES:

## 2021-06-10 NOTE — TELEPHONE ENCOUNTER
RN cannot approve Refill Request    RN can NOT refill this medication med is not covered by policy/route to provider. Last office visit: 11/13/2019 Tahir Ruiz MD Last Physical: 1/10/2018 Last MTM visit: Visit date not found Last visit same specialty: 11/13/2019 Tahir Ruiz MD.  Next visit within 3 mo: Visit date not found  Next physical within 3 mo: Visit date not found      Ethel Ley, [facility] Triage/Med Refill 8/19/2020    Requested Prescriptions   Pending Prescriptions Disp Refills     montelukast (SINGULAIR) 10 mg tablet [Pharmacy Med Name: MONTELUKAST 10MG TABLETS] 90 tablet 3     Sig: TAKE 1 TABLET BY MOUTH DAILY       There is no refill protocol information for this order

## 2021-06-11 NOTE — PROGRESS NOTES
Assessment and Plan:     Patient has been advised of split billing requirements and indicates understanding: No  1. Wellness examination  Patient overall has very good health habits though more recently suboptimal exercise.    2. Mild persistent asthma  Generally well controlled.    3. Psoriasis  Longstanding, followed by dermatology.    4. Allergic rhinitis  Stable.    5. Need for vaccination     - Influenza High Dose,Seasonal,PF 65+ Yrs  - Pneumococcal polysaccharide vaccine 23-valent 3 yo or older, subq/IM  - Influenza,Quad,High Dose,PF 65 YR+    6. Screening for lipid disorders     - Glucose  - Lipid Cascade    PLAN:  1.  Dose influenza and pneumonia 23 vaccines.  2.  Laboratory studies as above.  3.  Patient is followed regularly by dermatology.  4.  Encourage improved physical activity and good diet, patient should be seen again in 1 year.      The patient's current medical problems were reviewed.    I have had an Advance Directives discussion with the patient.  The following health maintenance schedule was reviewed with the patient and provided in printed form in the after visit summary:   Health Maintenance   Topic Date Due     HEPATITIS C SCREENING  1955     LIPID  05/15/2017     PNEUMOCOCCAL IMMUNIZATION 65+ LOW/MEDIUM RISK (1 of 2 - PCV13) 05/10/2019     DXA SCAN  05/10/2019     INFLUENZA VACCINE RULE BASED (1) 08/01/2020     ASTHMA ACTION PLAN  09/21/2021     Asthma Control Test  09/21/2021     MEDICARE ANNUAL WELLNESS VISIT  09/21/2021     FALL RISK ASSESSMENT  09/21/2021     MAMMOGRAM  12/12/2021     ADVANCE CARE PLANNING  01/10/2023     COLORECTAL CANCER SCREENING  12/05/2023     TD 18+ HE  09/28/2026     ZOSTER VACCINES  Completed     HEPATITIS B VACCINES  Aged Out        Subjective:   Chief Complaint: Radha Cooper is an 65 y.o. female here for an Annual Wellness visit.   HPI: Patient has a known underlying history of asthma she does feel that is overall been well controlled, she wonders of  some anxiety sometimes perhaps makes it a bit worse but overall well controlled has not had an exacerbation in here.    He does have underlying rhinitis as well but this is been relatively stable.    She has a history of underlying psoriasis this is longstanding goes back a number of years, followed by dermatology.    The patient reports that since the coronavirus she has been a bit less active emphasized good self-care activity diet and so forth.  No other significant change in her health status she is extremely diligent about minimizing risk.    No other significant change in her health status, I reviewed her allergies medications active medical problems past medical history past surgical history family history and social history.      Review of Systems:     Please see above.  The rest of the review of systems are negative for all systems.    Patient Care Team:  Tahir Ruiz MD as PCP - General  Tahir Ruiz MD as Assigned PCP     Patient Active Problem List   Diagnosis     Mild persistent asthma     Rhinitis     Psoriasis     Past Medical History:   Diagnosis Date     Shingles     Right leg, Right low back      Past Surgical History:   Procedure Laterality Date     ANKLE SURGERY Right     [id number]     CATARACT EXTRACTION Right 2015     HYSTERECTOMY  2010    Supracervical Hysterectomy Laparoscopic. With BSO.     OOPHORECTOMY  2010     TONSILLECTOMY      Description: Tonsillectomy;  Recorded: 04/13/2009;     TUBAL LIGATION      Description: Tubal Ligation;  Recorded: 04/13/2009;      Family History   Problem Relation Age of Onset     Ovarian cancer Maternal Aunt 60        Mother's half sister     Breast cancer Cousin 40     Heart attack Mother 62     CABG Mother         80s     Hypertension Mother      Asthma Father      Asthma Sister      Hypertension Sister      Asthma Sister      Hypertension Sister      Lupus Sister       Social History     Socioeconomic History     Marital status:       Spouse name: Not on file     Number of children: 0     Years of education: Not on file     Highest education level: Not on file   Occupational History     Occupation: [employer]   Social Needs     Financial resource strain: Not on file     Food insecurity     Worry: Not on file     Inability: Not on file     Transportation needs     Medical: Not on file     Non-medical: Not on file   Tobacco Use     Smoking status: Former Smoker     Packs/day: 1.00     Years: 20.00     Pack years: 20.00     Types: Cigarettes     Smokeless tobacco: Never Used     Tobacco comment: Quit mid-90s   Substance and Sexual Activity     Alcohol use: Yes     Alcohol/week: 7.0 standard drinks     Types: 7 Glasses of wine per week     Drug use: No     Sexual activity: Yes     Partners: Male   Lifestyle     Physical activity     Days per week: Not on file     Minutes per session: Not on file     Stress: Not on file   Relationships     Social connections     Talks on phone: Not on file     Gets together: Not on file     Attends Latter day service: Not on file     Active member of club or organization: Not on file     Attends meetings of clubs or organizations: Not on file     Relationship status: Not on file     Intimate partner violence     Fear of current or ex partner: Not on file     Emotionally abused: Not on file     Physically abused: Not on file     Forced sexual activity: Not on file   Other Topics Concern     Not on file   Social History Narrative    Diet- Regular, fish, chicken.        Exercise- treadmill, walk daily, body classes         -meningioma      Current Outpatient Medications   Medication Sig Dispense Refill     albuterol (PROAIR HFA;PROVENTIL HFA;VENTOLIN HFA) 90 mcg/actuation inhaler INHALE 1- 2 PUFFS BY MOUTH EVERY 4- 6 HOURS AS NEEDED 18 g 5     calcipotriene (DOVONOX) 0.005 % cream Apply topically 2 (two) times a day.       clobetasol (TEMOVATE) 0.05 % cream Apply topically 2 (two) times a day.        "crisaborole (EUCRISA) 2 % Oint Apply topically 2 (two) times a day.       fexofenadine-pseudoephedrine (ALLEGRA-D 12 HOUR)  mg per tablet TAKE 1 TABLET BY MOUTH TWICE DAILY 180 tablet 0     fluticasone propionate (FLONASE) 50 mcg/actuation nasal spray SHAKE LIQUID AND USE 2 SPRAYS IN EACH NOSTRIL DAILY 16 g 3     fluticasone propionate (FLOVENT HFA) 44 mcg/actuation inhaler Inhale 1 puff 2 (two) times a day. 1 Inhaler 0     montelukast (SINGULAIR) 10 mg tablet TAKE 1 TABLET BY MOUTH DAILY 90 tablet 0     VIVELLE-DOT 0.05 mg/24 hr        No current facility-administered medications for this visit.       Objective:   Vital Signs:   Visit Vitals  Ht 5' 3\" (1.6 m)   Wt 187 lb (84.8 kg)   LMP 10/09/2000   BMI 33.13 kg/m           VisionScreening:  No exam data present     PHYSICAL EXAM  Physical Exam   Constitutional: She is oriented to person, place, and time. She appears well-developed and well-nourished.   HENT:   Head: Normocephalic and atraumatic.   Right Ear: External ear normal.   Eyes: Pupils are equal, round, and reactive to light. Conjunctivae and EOM are normal.   Cardiovascular: Normal rate, regular rhythm and normal heart sounds.   Pulmonary/Chest: Effort normal and breath sounds normal.   Musculoskeletal: Normal range of motion.   Neurological: She is alert and oriented to person, place, and time.   Skin: Skin is warm and dry.   Psychiatric: She has a normal mood and affect. Her behavior is normal. Judgment normal.         Assessment Results 9/21/2020   Activities of Daily Living No help needed   Instrumental Activities of Daily Living No help needed   Mini Cog Total Score 5   Some recent data might be hidden     A Mini-Cog score of 0-2 suggests the possibility of dementia, score of 3-5 suggests no dementia  {Fall Risk no concern  {Cognitive Screen performed, no concern    Identified Health Risks:     The patient reports that she drinks more than one alcoholic drink per day but denies binge or " excessive drinking. She was counseled and given information about possible harmful effects of excessive alcohol intake.  The patient was provided with written information regarding signs of hearing loss.  Information regarding advance directives (living bravo), including where she can download the appropriate form, was provided to the patient via the AVS.

## 2021-06-11 NOTE — TELEPHONE ENCOUNTER
Controlled Substance Refill Request  Medication Name:   Requested Prescriptions     Pending Prescriptions Disp Refills     fexofenadine-pseudoephedrine (ALLEGRA-D 12 HOUR)  mg per tablet [Pharmacy Med Name: ALLEGRA-D 12HR TABLETS 30'S (OTC)] 180 tablet 0     Sig: TAKE 1 TABLET BY MOUTH TWICE DAILY     Date Last Fill: 4/17/20  [facility]  Submit electronically to pharmacy  Controlled Substance Agreement on file:   Encounter-Level CSA Scan Date:    There are no encounter-level csa scan date.        Last office visit:  11/13/19

## 2021-06-11 NOTE — TELEPHONE ENCOUNTER
Refill Approved    Rx renewed per Medication Renewal Policy. Medication was last renewed on 4/114/20.    Ethel Ley, [facility] Triage/Med Refill 9/22/2020     Requested Prescriptions   Pending Prescriptions Disp Refills     albuterol (PROAIR HFA;PROVENTIL HFA;VENTOLIN HFA) 90 mcg/actuation inhaler [Pharmacy Med Name: ALBUTEROL HFA INH (200 PUFFS) 18GM] 18 g 5     Sig: INHALE 1 TO 2 PUFFS BY MOUTH EVERY 4- 6 HOURS AS NEEDED       Albuterol/Levalbuterol Refill Protocol Passed - 9/20/2020 12:05 PM        Passed - PCP or prescribing provider visit in last year     Last office visit with prescriber/PCP: 11/13/2019 Tahir Ruiz MD OR same dept: 11/13/2019 Tahir Ruiz MD OR same specialty: 11/13/2019 Tahir Ruiz MD Last physical: 1/10/2018       Next appt within 3 mo: Visit date not found  Next physical within 3 mo: 9/21/2020 Tahir Ruiz MD  Prescriber OR PCP: Tahir Ruiz MD  Last diagnosis associated with med order: 1. Asthma  - albuterol (PROAIR HFA;PROVENTIL HFA;VENTOLIN HFA) 90 mcg/actuation inhaler [Pharmacy Med Name: ALBUTEROL HFA INH (200 PUFFS) 18GM]; INHALE 1 TO 2 PUFFS BY MOUTH EVERY 4- 6 HOURS AS NEEDED  Dispense: 18 g; Refill: 5    If protocol passes may refill for 6 months if within 3 months of last provider visit (or a total of 9 months). If patient requesting >1 inhaler per month refill x 6 months and have patient make appointment with provider.

## 2021-06-13 NOTE — TELEPHONE ENCOUNTER
RN cannot approve Refill Request    RN can NOT refill this medication Protocol failed and NO refill given. Last office visit: 11/13/2019 Tahir Ruiz MD Last Physical: 9/21/2020 Last MTM visit: Visit date not found Last visit same specialty: 11/13/2019 Tahir Ruiz MD.  Next visit within 3 mo: Visit date not found  Next physical within 3 mo: Visit date not found      Anat Mayo, [facility] Triage/Med Refill 11/14/2020    Requested Prescriptions   Pending Prescriptions Disp Refills     montelukast (SINGULAIR) 10 mg tablet [Pharmacy Med Name: MONTELUKAST 10MG TABLETS] 90 tablet 0     Sig: TAKE 1 TABLET BY MOUTH DAILY       There is no refill protocol information for this order

## 2021-06-15 NOTE — TELEPHONE ENCOUNTER
Requested Prescriptions     Pending Prescriptions Disp Refills     montelukast (SINGULAIR) 10 mg tablet 90 tablet 0     Sig: Take 1 tablet (10 mg total) by mouth daily.       Last physical  9/21/2020.    Thanks,  Joselyn Palomares, CMA

## 2021-06-15 NOTE — PROGRESS NOTES
ASSESSMENT:  1. Physical exam  Patient overall has good health habits.    2. Mild persistent asthma  Well-controlled, ACT score of 18.   albuterol (VENTOLIN HFA) 90 mcg/actuation inhaler; INHALE 1 TO 2 PUFFS BY MOUTH EVERY 4 TO 6 HOURS AS NEEDED  Dispense: 18 g; Refill: 11    3. Rhinitis  Secondary to seasonal allergies.  - fexofenadine-pseudoephedrine (ALLEGRA-D 12 HOUR)  mg per tablet; TAKE 1 TABLET BY MOUTH TWICE DAILY  Dispense: 180 tablet; Refill: 3   albuterol (VENTOLIN HFA) 90 mcg/actuation inhaler; INHALE 1 TO 2 PUFFS BY MOUTH EVERY 4 TO 6 HOURS AS NEEDED  Dispense: 18 g; Refill: 11    4. Obesity  BMI greater than 30, stable.    5. Psoriasis  Followed by dermatology.      6. Left-sided epistaxis  Only in the winter, I suspect community is the main factor.      PLAN:  1.  Patient has regular follow-up with both OB/GYN and to dermatology.  2.  Ophthalmology has discussed with her blepharoplasty but she would like to defer for now.  3.  I renewed the patient's allergy asthma medications.  4.  For the epistaxis, I recommend he humidified the air as well as using nasal Vaseline.  5.  Patient should otherwise be seen yearly.    No orders of the defined types were placed in this encounter.    Medications Discontinued During This Encounter   Medication Reason     fluticasone (FLOVENT HFA) 44 mcg/actuation inhaler Reorder     ALLEGRA-D 12 HOUR  mg per tablet Reorder     montelukast (SINGULAIR) 10 mg tablet Reorder     VENTOLIN HFA 90 mcg/actuation inhaler Reorder       No Follow-up on file.    CHIEF COMPLAINT:  Chief Complaint   Patient presents with     Annual Exam     Annual Physical, arthritis in leg and hip follow up.        SUBJECTIVE:  Radha is a 62 y.o. female presenting to the clinic today for an annual physical.     Health Maintenance: She is up-to-date on her Pap smear and continues to see an OB/GYN. She declines the influenza vaccine today but is up-to-date on her other immunizations.      REVIEW OF SYSTEMS:   She has been seeing a dermatologist for psoriasis; she has had the condition since childhood. Other than a flare up this year, she has not experienced an exacerbation of the condition for ten years. She believes that her asthma is under control. She has both a Flovent and albuterol inhaler; she uses the albuterol inhaler twice a day. She also uses Singulair and Allegra once a day. She experiences some seasonal allergies in the spring and fall. It typically presents as watery eyes. Her ophthalmologist has suggest that she undergo a bilateral blepharoplasty; she has noticed that her eyes droop at times. She has been having left sided nosebleeds once again that occur out of nowhere. She notes, however, that they do not last long. She has continued to experience left sided arthritic pains in the hip. 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 These joint pains are exacerbated by the colder weather. She denies any bladder issues.   All other systems are negative.    PFSH:  Surgical: She has had a hysterectomy and oophorectomy. She has also had a tubal ligation, cataract surgery, and ankle fracture surgery.    Past Medical: She had shingles in the past.   Social: She works for the [employer].   Family: Her father has asthma, and mother has had heart disease. One of her sisters has lupus.   Immunization History   Administered Date(s) Administered     DT (pediatric) 01/01/1996     Influenza [id number], 12/07/2009     Influenza, Seasonal, Inj PF IIV3 09/16/2010     Influenza, inj, historic,unspecified 10/13/2007, 10/13/2008, 09/14/2009, 10/15/2014     Influenza, seasonal,quad inj 36+ mos 09/21/2015     Influenza,seasonal, Inj IIV3 10/22/2004, 10/07/2005, 10/12/2006, 09/20/2011, 09/11/2012     Pneumo Conj 7-V(before 2010) 11/07/2014     Pneumo Polysac 23-V 11/04/2009     Td,adult,historic,unspecified 04/10/2006     Tdap 04/10/2006, 09/28/2016     ZOSTER 09/28/2016     Social History      Social History     Marital status:      Spouse name: N/A     Number of children: 0     Years of education: N/A     Occupational History     [employer]      Social History Main Topics     Smoking status: Former Smoker     Packs/day: 1.00     Years: 20.00     Types: Cigarettes     Smokeless tobacco: Never Used      Comment: Quit mid-90s     Alcohol use 4.2 oz/week     7 Glasses of wine per week     Drug use: No     Sexual activity: Yes     Partners: Male     Other Topics Concern     Not on file     Social History Narrative    Diet- Regular, fish, chicken.        Exercise- treadmill, walk daily, body classes      Past Medical History:   Diagnosis Date     Shingles     Right leg, Right low back     Family History   Problem Relation Age of Onset     Ovarian cancer Maternal Aunt 60     Mother's half sister     Breast cancer Cousin 40     Heart attack Mother 62     CABG Mother      80s     Hypertension Mother      Asthma Father      Asthma Sister      Hypertension Sister      Asthma Sister      Hypertension Sister      Lupus Sister        MEDICATIONS:  Current Outpatient Prescriptions   Medication Sig Dispense Refill     calcipotriene (DOVONOX) 0.005 % cream Apply topically 2 (two) times a day.       clobetasol (TEMOVATE) 0.05 % cream Apply topically 2 (two) times a day.       crisaborole (EUCRISA) 2 % Oint Apply topically 2 (two) times a day.       fexofenadine-pseudoephedrine (ALLEGRA-D 12 HOUR)  mg per tablet TAKE 1 TABLET BY MOUTH TWICE DAILY 180 tablet 3     fluticasone (FLOVENT HFA) 44 mcg/actuation inhaler Inhale 1 puff 2 (two) times a day. 1 Inhaler 11     montelukast (SINGULAIR) 10 mg tablet TAKE 1 TABLET BY MOUTH DAILY 90 tablet 1     VENTOLIN HFA 90 mcg/actuation inhaler INHALE 1 TO 2 PUFFS BY MOUTH EVERY 4 TO 6 HOURS AS NEEDED 18 g 0     VIVELLE-DOT 0.05 mg/24 hr        X-VIATE 40 % Crea        albuterol (VENTOLIN HFA) 90 mcg/actuation inhaler INHALE 1 TO 2 PUFFS BY MOUTH EVERY 4 TO 6  "HOURS AS NEEDED 18 g 11     montelukast (SINGULAIR) 10 mg tablet Take 1 tablet (10 mg total) by mouth daily. 90 tablet 3     No current facility-administered medications for this visit.        TOBACCO USE:  History   Smoking Status     Former Smoker     Packs/day: 1.00     Years: 20.00     Types: Cigarettes   Smokeless Tobacco     Never Used     Comment: Quit mid-90s       VITALS:  Vitals:    01/10/18 1548   BP: 126/66   Pulse: 90   Temp: 98.6  F (37  C)   Weight: 180 lb 8 oz (81.9 kg)   Height: 5' 3.25\" (1.607 m)     Wt Readings from Last 3 Encounters:   01/10/18 180 lb 8 oz (81.9 kg)   03/10/17 176 lb (79.8 kg)   09/28/16 178 lb (80.7 kg)       PHYSICAL EXAM:  Constitutional:   Reveals a pleasant female that appears stated age.  Vitals: per nursing notes.  HEENT: Right Ear: External ear normal.   Left Ear: External ear normal.   Nose: Nose normal.   Mouth/Throat: Oropharynx is clear and moist.   Eyes: Conjunctivae and EOM are normal. Pupils are equal, round, and reactive to light. Right eye exhibits no discharge. Left eye exhibits no discharge.   Neck:  Supple, no carotid bruits or adenopathy.  Back:  No spine or CVA pain.  Thorax:  No bony deformities.  Lungs: Clear to A&P without rales or wheezes.  Respiratory effort normal.  Cardiac:   Regular rate and rhythm, normal S1, S2, no murmur or gallop.  Abdomen:  Soft, active bowel sounds without bruits, mass, or tenderness.  Extremities:   No peripheral edema, pulses in the feet intact.    Skin:  No jaundice, peripheral cyanosis or lesions to suggest malignancy.  Neuro:  Alert and oriented. Cranial nerves, motor, sensory exams are intact.  No gross focal deficits.  Psychiatric:  Memory intact, mood appropriate.    QUALITY MEASURES:  I have had an Advance Directives discussion with the patient.    DATA REVIEWED:  Additional History from Old Records Summarized (2): Reviewed progress note 10/11/16; sore throat. Reviewed [facility] note 8/5/13; colonoscopy.   Decision to " Obtain Records (1): None.  Radiology Tests Summarized or Ordered (1): None.  Labs Reviewed or Ordered (1): Reviewed labs 5/15/12; lipid profile.   Medicine Test Summarized or Ordered (1): None.  Independent Review of EKG, X-RAY, or RAPID STREP (2 each): None.     The visit lasted a total of 26 minutes face to face with the patient. Over 50% of the time was spent counseling and educating the patient about health maintenance.    IGarfield, am scribing for and in the presence of, Dr. Ruiz.    I, Dr. Ruiz, personally performed the services described in this documentation, as scribed by Garfield Ruiz in my presence, and it is both accurate and complete.    Total Data Points: 3

## 2021-06-15 NOTE — TELEPHONE ENCOUNTER
Refill Request  Medication name:   Requested Prescriptions     Pending Prescriptions Disp Refills     fexofenadine-pseudoephedrine (ALLEGRA-D 12 HOUR)  mg per tablet 180 tablet 0     Sig: TAKE 1 TABLET BY MOUTH TWICE DAILY     Who prescribed the medication: Tahir Ruiz - PCP  Last refill on medication: 09/22/2020 - 180 tablets  [facility]  Last appointment with PCP: 09/21/2020  Next appointment: Not due

## 2021-06-16 PROBLEM — L40.9 PSORIASIS: Status: ACTIVE | Noted: 2018-01-10

## 2021-06-16 NOTE — PROGRESS NOTES
Assessment:   1. Pneumonia  Nebulizer treatment administer in the clinic.   - Influenza A/B Rapid Test  - XR Chest 2 Views  - azithromycin (ZITHROMAX Z-SRIKANTH) 250 MG tablet; Take 2 tablets (500 mg) on  Day 1,  followed by 1 tablet (250 mg) once daily on Days 2 through 5.  Dispense: 6 tablet; Refill: 0  - predniSONE (DELTASONE) 20 MG tablet; Take 2 tablets (40 mg total) by mouth daily.  Dispense: 10 tablet; Refill: 0    2. Mild persistent asthma without complication  - ipratropium-albuterol 0.5-2.5 mg/3 mL nebulizer solution 3 mL (DUO-NEB); Take 3 mL by nebulization once.  - XR Chest 2 Views  - predniSONE (DELTASONE) 20 MG tablet; Take 2 tablets (40 mg total) by mouth daily.  Dispense: 10 tablet; Refill: 0     Plan:   Antibiotics per medication orders.  Antitussives per medication orders.  Avoid exposure to tobacco smoke and fumes.  B-agonist inhaler.  Call if shortness of breath worsens, blood in sputum, change in character of cough, development of fever or chills, inability to maintain nutrition and hydration. Avoid exposure to tobacco smoke and fumes.  Chest x-ray.  Follow-up in 7 days, or sooner as needed.  Steroid inhaler as ordered.     Subjective:   Radha Cooper is a 62 y.o. female here for evaluation of a cough. Onset of symptoms was 4 days ago. Symptoms have been gradually worsening since that time. The cough is barky, harsh and nonproductive and is aggravated by nothing. Associated symptoms include: chest pain, chills, fever, night sweats, shortness of breath and wheezing. Patient does have a history of asthma. Patient does have a history of environmental allergens. Patient has not traveled recently. Patient does not have a history of smoking. Patient has not had a previous chest x-ray. Patient has not had a PPD done.    The following portions of the patient's history were reviewed and updated as appropriate: allergies, current medications, past family history, past medical history, past social history,  "past surgical history and problem list.    Review of Systems  A 12 point comprehensive review of systems was negative except as noted.      Objective:   Oxygen saturation 97% on room air  /73  Pulse 80  Temp 99.3  F (37.4  C) (Oral)   Ht 5' 3.25\" (1.607 m)  Wt 179 lb 4.8 oz (81.3 kg)  LMP 10/09/2000  SpO2 97%  BMI 31.51 kg/m2  General appearance: alert, appears stated age and cooperative  Head: Normocephalic, without obvious abnormality, atraumatic  Eyes: conjunctivae/corneas clear. PERRL, EOM's intact. Fundi benign.  Ears: normal TM's and external ear canals both ears  Nose: yellow discharge, moderate congestion, turbinates inflamed  Throat: lips, mucosa, and tongue normal; teeth and gums normal  Lungs: wheezes bilaterally  Heart: regular rate and rhythm, S1, S2 normal, no murmur, click, rub or gallop  Pulses: 2+ and symmetric  Skin: Skin color, texture, turgor normal. No rashes or lesions  Lymph nodes: Cervical, supraclavicular, and axillary nodes normal.  Neurologic: Grossly normal        "

## 2021-06-16 NOTE — TELEPHONE ENCOUNTER
Refill Approved    Rx renewed per Medication Renewal Policy. Medication was last renewed on 7/17/20.    Collin Ba, [facility] Triage/Med Refill 4/19/2021     Requested Prescriptions   Pending Prescriptions Disp Refills     fluticasone propionate (FLOVENT HFA) 44 mcg/actuation inhaler 1 Inhaler 0     Sig: Inhale 1 puff 2 (two) times a day.       Asthma Medications Refill Protocol Passed - 4/18/2021 12:19 PM        Passed - PCP or prescribing provider visit in last year     Last office visit with prescriber/PCP: 11/13/2019 Tahir Ruiz MD OR same dept: Visit date not found OR same specialty: 11/13/2019 Tahir Ruiz MD  Last physical: 9/21/2020 Last MTM visit: Visit date not found    Next appt within 3 mo: Visit date not found Next physical within 3 mo: Visit date not found  Prescriber OR PCP: Tahir Ruiz MD  Last diagnosis associated with med order: 1. Allergic rhinitis due to pollen, unspecified seasonality  - fluticasone propionate (FLOVENT HFA) 44 mcg/actuation inhaler; Inhale 1 puff 2 (two) times a day.  Dispense: 1 Inhaler; Refill: 0    2. Environmental allergies  - montelukast (SINGULAIR) 10 mg tablet; Take 1 tablet (10 mg total) by mouth daily.  Dispense: 90 tablet; Refill: 0    3. Multiple allergies  - fexofenadine-pseudoephedrine (ALLEGRA-D 12 HOUR)  mg per tablet; TAKE 1 TABLET BY MOUTH TWICE DAILY  Dispense: 180 tablet; Refill: 0    If protocol passes may refill for 6 months if within 3 months of last provider visit (or a total of 9 months).             montelukast (SINGULAIR) 10 mg tablet 90 tablet 0     Sig: Take 1 tablet (10 mg total) by mouth daily.       Asthma Medications Refill Protocol Passed - 4/18/2021 12:19 PM        Passed - PCP or prescribing provider visit in last year     Last office visit with prescriber/PCP: 11/13/2019 Tahir Ruiz MD OR same dept: Visit date not found OR same specialty: 11/13/2019 Tahir Ruiz MD  Last physical: 9/21/2020 Last MTM  visit: Visit date not found    Next appt within 3 mo: Visit date not found Next physical within 3 mo: Visit date not found  Prescriber OR PCP: Tahir Ruiz MD  Last diagnosis associated with med order: 1. Allergic rhinitis due to pollen, unspecified seasonality  - fluticasone propionate (FLOVENT HFA) 44 mcg/actuation inhaler; Inhale 1 puff 2 (two) times a day.  Dispense: 1 Inhaler; Refill: 0    2. Environmental allergies  - montelukast (SINGULAIR) 10 mg tablet; Take 1 tablet (10 mg total) by mouth daily.  Dispense: 90 tablet; Refill: 0    3. Multiple allergies  - fexofenadine-pseudoephedrine (ALLEGRA-D 12 HOUR)  mg per tablet; TAKE 1 TABLET BY MOUTH TWICE DAILY  Dispense: 180 tablet; Refill: 0    If protocol passes may refill for 6 months if within 3 months of last provider visit (or a total of 9 months).             fexofenadine-pseudoephedrine (ALLEGRA-D 12 HOUR)  mg per tablet 180 tablet 0     Sig: TAKE 1 TABLET BY MOUTH TWICE DAILY       There is no refill protocol information for this order

## 2021-06-17 NOTE — TELEPHONE ENCOUNTER
Refill Request  Medication name:   Requested Prescriptions     Pending Prescriptions Disp Refills     montelukast (SINGULAIR) 10 mg tablet 90 tablet 1     Sig: Take 1 tablet (10 mg total) by mouth daily.     Who prescribed the medication: Tahir Ruiz MD  Last refill on medication: 02/17/2021  [facility]  Last appointment with PCP: 09/21/2020  Next appointment: Not due

## 2021-06-18 NOTE — PATIENT INSTRUCTIONS - HE
Patient Instructions by Tahir Ruiz MD at 9/21/2020  1:30 PM     Author: Tahir Ruiz MD Service: -- Author Type: Physician    Filed: 9/21/2020  2:27 PM Encounter Date: 9/21/2020 Status: Addendum    : Tahir Ruiz MD (Physician)    Related Notes: Original Note by Tahir Ruiz MD (Physician) filed at 9/21/2020  1:45 PM       I will comment on laboratory studies.  See us again in 1 year.          Patient Education   Alcohol Use   Many people can enjoy a glass of wine or beer without any negative consequences to their health. According to the Centers for Disease Control and Prevention (CDC), having one or fewer drinks per day for women and two or fewer per day for men is considered moderate drinking.     When people drink more than moderately, it can become concerning. Excessive drinking is defined as consuming 15 drinks or more per week for men and 8 drinks or more per week for women. There are various health problems associated with excessive drinking, which include:    Damage to vital organs like the heart, brain, liver and pancreas    Harm to the digestive tract    Weaken the immune system    Higher risk for heart disease and cancer       Patient Education   Signs of Hearing Loss  Hearing loss is a problem shared by many people. In fact, it is one of the most common health conditions, particularly as people age. Most people over age 65 have some hearing loss, and by age 80, almost everyone does. Because hearing loss usually occurs slowly over the years, you may not realize your hearing ability has gotten worse.       Have your hearing checked  Contact your [facility] care provider if you:    Have to strain to hear normal conversation.    Have to watch other peoples faces very carefully to follow what theyre saying.    Need to ask people to repeat what theyve said.    Often misunderstand what people are saying.    Turn the volume of the television or radio up so high that others  complain.    Feel that people are mumbling when theyre talking to you.    Find that the effort to hear leaves you feeling tired and irritated.    Notice, when using the phone, that you hear better with 1 ear than the other.    0971-2147 [employer]. [address] [place] [address] [id number]. All rights reserved. This information is not intended as a substitute for professional medical care. Always follow your healthcare professional's instructions.         Patient Education   Understanding Advance Care Planning  Advance care planning is the process of deciding ones own future medical care. It helps ensure that if you cant speak for yourself, your wishes can still be carried out. The plan is a series of legal documents that note a persons wishes. The documents vary by state. Advance care planning may be done when a person has a serious illness that is expected to get worse. It may be done before major surgery. And it can help you and your family be prepared in case of a major illness or injury. Advance care planning helps with making decisions at these times.       A health care proxy is a person who acts as the voice of a patient when the patient cant speak for himself or herself. The name of this role varies by state. It may be called a Durable Medical Power of  or Durable Power of  for Healthcare. It may be called an agent, surrogate, or advocate. Or it may be called a representative or decision maker. It is an official duty that is identified by a legal document. The document also varies by state.    Why Is Advance Care Planning Important?  If a person communicates their healthcare wishes:    They will be given medical care that matches their values and goals.    Their family members will not be forced to make decisions in a crisis with no guidance.  Creating a Plan  Making an advance care plan is often done in 3 steps:    Thinking about ones wishes. To create an advance care plan,  you should think about what kind of medical treatment you would want if you lose the ability to communicate. Are there any situations in which you would refuse or stop treatment? Are there therapies you would want or not want? And whom do you want to make decisions for you? There are many places to learn more about how to plan for your care. Ask your doctor or  for resources.    Picking a health care proxy. This means choosing a trusted person to speak for you only when you cant speak for yourself. When you cannot make medical decisions, your proxy makes sure the instructions in your advance care plan are followed. A proxy does not make decisions based on his or her own opinions. They must put aside those opinions and values if needed, and carry out your wishes.    Filling out the legal documents. There are several kinds of legal documents for advance care planning. Each one tells health care providers your wishes. The documents may vary by state. They must be signed and may need to be witnessed or notarized. You can cancel or change them whenever you wish. Depending on your state, the documents may include a Healthcare Proxy form, Living Will, Durable Medical Power of , Advance Directive, or others.  The Familys Role  The best help a family can give is to support their loved ones wishes. Open and honest communication is vital. Family should express any concerns they have about the patients choices while the patient can still make decisions.    7103-9120 [employer]. [address] [place] [address]. All rights reserved. This information is not intended as a substitute for professional medical care. Always follow your healthcare professional's instructions.         Also, [employer] [place] offers a free, downloadable health care directive that allows you to share your treatment choices and personal preferences if you cannot communicate your wishes. It also allows  you to appoint another person (called a health care agent) to make health care decisions if you are unable to do so. You can download an advance directive by going here: [email or web address]     Patient Education   Personalized Prevention Plan  You are due for the preventive services outlined below.  Your care team is available to assist you in scheduling these services.  If you have already completed any of these items, please share that information with your care team to update in your medical record.  Health Maintenance   Topic Date Due   ? HEPATITIS C SCREENING  1955   ? LIPID  05/15/2017   ? PNEUMOCOCCAL IMMUNIZATION 65+ LOW/MEDIUM RISK (1 of 2 - PCV13) 05/10/2019   ? DXA SCAN  05/10/2019   ? INFLUENZA VACCINE RULE BASED (1) 08/01/2020   ? Asthma Control Test  10/02/2020   ? FALL RISK ASSESSMENT  10/02/2020   ? ASTHMA ACTION PLAN  09/21/2021   ? MEDICARE ANNUAL WELLNESS VISIT  09/21/2021   ? MAMMOGRAM  12/12/2021   ? ADVANCE CARE PLANNING  01/10/2023   ? COLORECTAL CANCER SCREENING  12/05/2023   ? TD 18+ HE  09/28/2026   ? ZOSTER VACCINES  Completed   ? HEPATITIS B VACCINES  Aged Out

## 2021-06-19 NOTE — PROGRESS NOTES
ASSESSMENT:  1. Screen for colon cancer  Patient is due for colorectal cancer screening.  - Ambulatory referral for Colonoscopy    2. Right ear pain  2 months of right ear discomfort, suspect serous otitis media.    3. Rhinitis  Chronic long-standing, contributing factor to right ear pain.    4. Mild persistent asthma  I will controlled    5. Epistaxis  From left nares, presume from dry mucous membranes.      PLAN:  1.  In terms of the right ear pain, for now watchful waiting, continue with her current symptomatic treatment.  2.  If after 1 month or so the patient is still having right ear discomfort I would then send to ENT.  3.  No other change in the patient's therapy or medications.  4.  I would want to see the patient in no more than 6 months for follow-up comorbidities.    No orders of the defined types were placed in this encounter.    Medications Discontinued During This Encounter   Medication Reason     VENTOLIN HFA 90 mcg/actuation inhaler Therapy completed     montelukast (SINGULAIR) 10 mg tablet Therapy completed     fluticasone (FLOVENT HFA) 110 mcg/actuation inhaler Therapy completed       No Follow-up on file.    CHIEF COMPLAINT:  Chief Complaint   Patient presents with     Ear Pain     right ear pain x 2 months o/o- right side nose bleeds last pm        SUBJECTIVE:  Radha is a 63 y.o. female who comes in because she has had about 2 months of right ear pain or pressure.  Patient does have a history of rhinitis and she is using Allegra-D daily as well as Flonase but for about the past 2 months her right ear has felt like there is fluid behind it or something going on.  He is tried some swimmer's eardrops that have not made any difference she is not hearing as well and she feels like her balance may be a little bit off.    Patient does feel more congested than usual but her usual medications have not changed.    Patient did use her inhaler she had a day she had a sensation of chest tightness but in  general her asthma has been well controlled.    Patient is also due for colorectal cancer screening but will be scheduling appointment in the fairly near future.    Patient also noticed yesterday some left nose bleeding she does occasionally have bleeding of the left nose.  She does believe that some humidifier she uses in her office and at home does help.    REVIEW OF SYSTEMS:      All other systems are negative.    PFSH:  Immunization History   Administered Date(s) Administered     DT (pediatric) 01/01/1996     Influenza [id number], 12/07/2009     Influenza, Seasonal, Inj PF IIV3 09/16/2010     Influenza, inj, historic,unspecified 10/13/2007, 10/13/2008, 09/14/2009, 10/15/2014     Influenza, seasonal,quad inj 36+ mos 09/21/2015     Influenza,seasonal, Inj IIV3 10/22/2004, 10/07/2005, 10/12/2006, 09/20/2011, 09/11/2012     Pneumo Conj 7-V(before 2010) 11/07/2014     Pneumo Polysac 23-V 11/04/2009     Td,adult,historic,unspecified 04/10/2006     Tdap 04/10/2006, 09/28/2016     ZOSTER, LIVE 09/28/2016     Social History     Social History     Marital status:      Spouse name: N/A     Number of children: 0     Years of education: N/A     Occupational History     [employer]      Social History Main Topics     Smoking status: Former Smoker     Packs/day: 1.00     Years: 20.00     Types: Cigarettes     Smokeless tobacco: Never Used      Comment: Quit mid-90s     Alcohol use 4.2 oz/week     7 Glasses of wine per week     Drug use: No     Sexual activity: Yes     Partners: Male     Other Topics Concern     Not on file     Social History Narrative    Diet- Regular, fish, chicken.        Exercise- treadmill, walk daily, body classes      Past Medical History:   Diagnosis Date     Shingles     Right leg, Right low back     Family History   Problem Relation Age of Onset     Ovarian cancer Maternal Aunt 60     Mother's half sister     Breast cancer Cousin 40     Heart attack Mother 62     CABG Mother      80s      Hypertension Mother      Asthma Father      Asthma Sister      Hypertension Sister      Asthma Sister      Hypertension Sister      Lupus Sister        MEDICATIONS:  Current Outpatient Prescriptions   Medication Sig Dispense Refill     albuterol (PROAIR HFA;PROVENTIL HFA;VENTOLIN HFA) 90 mcg/actuation inhaler Inhale.       albuterol (VENTOLIN HFA) 90 mcg/actuation inhaler INHALE 1 TO 2 PUFFS BY MOUTH EVERY 4 TO 6 HOURS AS NEEDED 18 g 11     calcipotriene (DOVONOX) 0.005 % cream Apply topically 2 (two) times a day.       clobetasol (TEMOVATE) 0.05 % cream Apply topically 2 (two) times a day.       crisaborole (EUCRISA) 2 % Oint Apply topically 2 (two) times a day.       fexofenadine-pseudoephedrine (ALLEGRA-D 12 HOUR)  mg per tablet TAKE 1 TABLET BY MOUTH TWICE DAILY 180 tablet 3     fluticasone (FLOVENT HFA) 44 mcg/actuation inhaler Inhale 1 puff 2 (two) times a day. 1 Inhaler 11     montelukast (SINGULAIR) 10 mg tablet Take 1 tablet (10 mg total) by mouth daily. 90 tablet 3     montelukast (SINGULAIR) 10 mg tablet TAKE 1 TABLET(10 MG) BY MOUTH DAILY 90 tablet 1     norethindrone-ethinyl estradiol (LOESTRIN FE 1/20, 28-DAY,) 1 mg-20 mcg (21)/75 mg (7) per tablet Take by mouth.       predniSONE (DELTASONE) 20 MG tablet Take 2 tablets (40 mg total) by mouth daily. 10 tablet 0     VENTOLIN HFA 90 mcg/actuation inhaler INHALE 1 TO 2 PUFFS BY MOUTH EVERY 4 TO 6 HOURS AS NEEDED 18 g 0     VIVELLE-DOT 0.05 mg/24 hr        X-VIATE 40 % Crea        No current facility-administered medications for this visit.        TOBACCO USE:  History   Smoking Status     Former Smoker     Packs/day: 1.00     Years: 20.00     Types: Cigarettes   Smokeless Tobacco     Never Used     Comment: Quit mid-90s       VITALS:  Vitals:    07/17/18 1426   BP: 110/70   Pulse: 74   Weight: 180 lb (81.6 kg)     Wt Readings from Last 3 Encounters:   07/17/18 180 lb (81.6 kg)   02/15/18 179 lb 4.8 oz (81.3 kg)   01/10/18 180 lb 8 oz (81.9 kg)        PHYSICAL EXAM:  Constitutional:   Reveals a female who appears her stated age no obvious distress.  Vitals: per nursing notes.  HEENT:  Ears:  External canals, TMs clear.  There is just the slightest amount of cerumen in the right ear canal and a slight amount of cerumen covering the tympanic membrane but the tympanic membrane otherwise looks normal in appearance.  There is a slight amount of friable tissue in the left nasal septum but no active bleeding.  Eyes:  EOMs full, PERRL.  Lungs: Clear to A&P without rales or wheezes.  Respiratory effort normal.  Cardiac:   Regular rate and rhythm, normal S1, S2, no murmur or gallop.  Musculoskeletal: No peripheral swelling.  Neuro:  Alert and oriented. Cranial nerves, motor, sensory exams are intact.  No gross focal deficits.  Psychiatric:  Memory intact, mood appropriate.    QUALITY MEASURES:      DATA REVIEWED:

## 2021-06-20 NOTE — PROGRESS NOTES
Hearing evaluation in conjunction with ENT exam (Dr. Navarro)    History:  Patient has noted fullness in right ear for the past 5-6 months; Eustachian tube dysfunction was suspected by PCP, and she has been using steroid nasal spray/oral antihistamines daily since spring. She reported having pneumonia over the winter months. She denied any changes in hearing, dizziness, otorrhea, history of noise exposure, or recent illness. She endorsed bilateral, occasional tinnitus (non-pulsatile) and otalgia when her dog barks.    Results:  Otoscopy revealed abnormal-appearing tympanic membranes, bilaterally, with near complete absence of cerumen in both ears.  Hearing sensitivity was assessed with good reliability using insert phones.      Normal hearing sensitivity, bilaterally, for 250-8000 Hz.     Speech reception thresholds showed agreement with pure tone findings in each ear. Word recognition ability was excellent, bilaterally, with presentation levels at typical conversational volume in both ears.    Tympanometry was consistent with normal middle ear function, bilaterally (shallow tracing was obtained int he right ear only).    Recommendations:  Follow-up with ENT as scheduled this afternoon; retest hearing per medical management or patient concern.  Wear hearing protection consistently in noise.     Chela Costa, [facility]  [place] Licensed Audiologist [id number]

## 2021-06-20 NOTE — LETTER
Letter by Tahir Ruiz MD at      Author: Tahir Ruiz MD Service: -- Author Type: --    Filed:  Encounter Date: 9/22/2020 Status: (Other)         Radha Cooper  [address] [address]             September 22, 2020         Dear Ms. Cooper,    Below are the results from your recent visit: Sugar is good at 88, no diabetes.  The cholesterol is actually very well controlled, the total is high only because the HDL so-called good cholesterol is high but this is a very good cholesterol result.    Resulted Orders   Glucose   Result Value Ref Range    Glucose 88 mg/dL    Patient Fasting > 8hrs? Yes    Lipid Cascade   Result Value Ref Range    Cholesterol 217 (H) mg/dL    Triglycerides 84 mg/dL    HDL Cholesterol 82 mg/dL    LDL Calculated 118 mg/dL    Patient Fasting > 8hrs? Yes             Please call with questions or contact us using [employer].    Sincerely,        Electronically signed by Tahir Ruiz MD

## 2021-06-20 NOTE — PROGRESS NOTES
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 She states she can tolerate as an adult and has gotten flu vaccines before. She also states that a few years ago she had a severe flare up of her asthma after she received the flu vaccine. She states she had a hard time breathing and went to see an allergist. Spoke with Dr. Ruiz and he said patient should be seen before she gets the vaccine. Scheduled an appt for 10/5/18 with provider to discuss.

## 2021-06-20 NOTE — PROGRESS NOTES
Radha Cooper is a 63 y.o. female seen in consultation at the request of Dr. Ruiz for intermittent right ear pain for the last 5-6 months.  She denies hearing change.  She had an ear infection 18 years ago otherwise no otologic history of infections or surgeries.  She denies tinnitus and vertigo.    ALLERGY:    Allergies   Allergen Reactions     Penicillins Rash     rash--in childhood       Shellfish Containing Products Other (See Comments)     Abdominal pain         MEDICATIONS:     Current Outpatient Prescriptions on File Prior to Visit   Medication Sig Dispense Refill     albuterol (PROAIR HFA;PROVENTIL HFA;VENTOLIN HFA) 90 mcg/actuation inhaler Inhale.       albuterol (VENTOLIN HFA) 90 mcg/actuation inhaler INHALE 1 TO 2 PUFFS BY MOUTH EVERY 4 TO 6 HOURS AS NEEDED 18 g 11     calcipotriene (DOVONOX) 0.005 % cream Apply topically 2 (two) times a day.       clobetasol (TEMOVATE) 0.05 % cream Apply topically 2 (two) times a day.       crisaborole (EUCRISA) 2 % Oint Apply topically 2 (two) times a day.       fexofenadine-pseudoephedrine (ALLEGRA-D 12 HOUR)  mg per tablet TAKE 1 TABLET BY MOUTH TWICE DAILY 180 tablet 3     fluticasone (FLOVENT HFA) 44 mcg/actuation inhaler Inhale 1 puff 2 (two) times a day. 1 Inhaler 11     montelukast (SINGULAIR) 10 mg tablet Take 1 tablet (10 mg total) by mouth daily. 90 tablet 3     montelukast (SINGULAIR) 10 mg tablet TAKE 1 TABLET(10 MG) BY MOUTH DAILY 90 tablet 1     norethindrone-ethinyl estradiol (LOESTRIN FE 1/20, 28-DAY,) 1 mg-20 mcg (21)/75 mg (7) per tablet Take by mouth.       predniSONE (DELTASONE) 20 MG tablet Take 2 tablets (40 mg total) by mouth daily. 10 tablet 0     VENTOLIN HFA 90 mcg/actuation inhaler INHALE 1 TO 2 PUFFS BY MOUTH EVERY 4 TO 6 HOURS AS NEEDED 18 g 0     VIVELLE-DOT 0.05 mg/24 hr        X-VIATE 40 % Crea        No current facility-administered medications on file prior to visit.        Past Medical/Surgical History, Family History and  Social History reviewed in detail and documented separately in the medical record.    Complete Review of Systems:  A 10-point review was performed.  Pertinent positives are noted in the HPI and on a separate scanned document in the chart.    Family History not pertinent to the to Chief Complaint or presenting problem    EXAM:  There were no vitals filed for this visit.    Nurse documentation reviewed  and documented separately.    General Appearance: Pleasant, alert, appropriate appearance for age. No acute distress    Head Exam: Normal. Normocephalic, atraumatic.    Eye Exam: Normal external eye, conjunctiva, lids, cornea. Extra-ocular movements are intact.    Left external ear: normal  Left otoscopic exam: Normal EAC. Normal TM     Right external ear: normal  Right otoscopic exam: Normal EAC. Normal TM    Nose Exam: Normal external nose. Septum midline. Nasal mucosa normal.  Inferior turbinates normal.    OroPharynx Exam: Dental hygiene adequate. Normal tongue. Normal buccal mucosa. Normal palate.  Normal pharynx. Normal tonsils.    Neck Exam: Supple, no masses or nodes. Trachea and larynx midline.    Thyroid Exam: No tenderness, nodules or enlargement.    Salivary Glands: nontender without masses    Skin:  Intact    Neuro: Alert and oriented times 3, CN 2-12 grossly intact, no nystagmus, PERRL, EOMI, normal speech and gait    Chest/Respiratory Exam: Normal chest wall motion and respiratory effort. No audible stridor or wheezing.    Cardiovascular Exam: Regular rate and rhythm.  No cyanosis, clubbing or edema.    Pulses: carotid pulses normal    Mood:  Pleasant, cooperative    ASSESSMENT:  1. Otalgia, right        PLAN: Findings, assessment, and management options were discussed. Hearing is relatively symmetric and no negative pressure found on today's examination.  Would likely think this is referred pain from a TMJ type disorder.  She does note she has had recent crowns placed and is to be wearing a mouth guard  at night.  She is also feeling better today so it is possible we are just catching her when she is good.  Thus the plan will be to bring this up with her dentist or return if there is a significant change in her symptoms.

## 2021-06-20 NOTE — LETTER
Letter by Tahir Ruiz MD at      Author: Tahir Ruiz MD Service: -- Author Type: --    Filed:  Encounter Date: 12/13/2019 Status: Signed         Radha Cooper  [address] [facility] [address]             December 13, 2019         Dear Ms. Kenneth,    Below are the results from your recent visit: Normal mammogram.    Resulted Orders   Mammo Screening Bilateral    Narrative    BILATERAL FULL FIELD DIGITAL SCREENING MAMMOGRAM WITH TOMOSYNTHESIS    Performed on: 12/12/19.      Compared to: 12/10/2018 Mammo Screening Bilateral, 10/09/2017 Mammo   Screening Bilateral, and 01/11/2016 Mammo Screening Bilateral      Findings: The breasts have scattered areas of fibroglandular densities.   There is no radiographic evidence of malignancy. This study was evaluated   with the assistance of Computer-Aided Detection. Breast Tomosynthesis was   used in interpretation. Repeat routine screening mammogram in one year is   recommended.      ACR BI-RADS Category 1: Negative            Please call with questions or contact us using [employer].    Sincerely,        Electronically signed by Tahir Ruiz MD

## 2021-06-20 NOTE — PROGRESS NOTES
ASSESSMENT:  1. Mild persistent asthma without complication  Asthma is overall well controlled.      PLAN:  1.  Influenza vaccine.  2.  Patient to get her colonoscopy in the near future.  3.  Patient will be due for a physical sometime summer 2019.       Orders Placed This Encounter   Procedures     Influenza, Seasonal,Quad Inj, 36+ MOS     Medications Discontinued During This Encounter   Medication Reason     montelukast (SINGULAIR) 10 mg tablet Duplicate order       No Follow-up on file.    CHIEF COMPLAINT:  Chief Complaint   Patient presents with     Follow-up     reaction  to flu vacc 5 yrs ago- wants to get flu vacc today        SUBJECTIVE:  Radha is a 63 y.o. female who comes in because of wanting to get a flu shot.  Patient was actually in the clinic several days ago, but because she stated that she had a reaction of her asthma after having gotten a flu shot about 5 years ago, since there is some confusion about exactly what the reaction was she was told she needed to be seen.    About 5 years ago the patient got a flu shot within 2 weeks and that she had an exacerbation of her asthma however subsequent to that she is gotten flu shots and has not had any problem, therefore I do not believe that the patient has any problem or difficulty with the flu vaccine itself rather reaction of asthma after the flu shot was coincidental and not causative.    She reports her asthma has otherwise been well, no other change in her health status and overall she is doing well.    REVIEW OF SYSTEMS:      All other systems are negative.    PFSH:  Immunization History   Administered Date(s) Administered     DT (pediatric) 01/01/1996     Influenza [id number], 12/07/2009     Influenza, Seasonal, Inj PF IIV3 09/16/2010     Influenza, inj, historic,unspecified 10/13/2007, 10/13/2008, 09/14/2009, 10/15/2014     Influenza, seasonal,quad inj 36+ mos 09/21/2015, 10/05/2018     Influenza,seasonal, Inj IIV3 10/22/2004, 10/07/2005,  10/12/2006, 09/20/2011, 09/11/2012     Pneumo Conj 7-V(before 2010) 11/07/2014     Pneumo Polysac 23-V 11/04/2009     Td,adult,historic,unspecified 04/10/2006     Tdap 04/10/2006, 09/28/2016     ZOSTER, LIVE 09/28/2016     Social History     Social History     Marital status:      Spouse name: N/A     Number of children: 0     Years of education: N/A     Occupational History     [employer]      Social History Main Topics     Smoking status: Former Smoker     Packs/day: 1.00     Years: 20.00     Types: Cigarettes     Smokeless tobacco: Never Used      Comment: Quit mid-90s     Alcohol use 4.2 oz/week     7 Glasses of wine per week     Drug use: No     Sexual activity: Yes     Partners: Male     Other Topics Concern     Not on file     Social History Narrative    Diet- Regular, fish, chicken.        Exercise- treadmill, walk daily, body classes      Past Medical History:   Diagnosis Date     Shingles     Right leg, Right low back     Family History   Problem Relation Age of Onset     Ovarian cancer Maternal Aunt 60     Mother's half sister     Breast cancer Cousin 40     Heart attack Mother 62     CABG Mother      80s     Hypertension Mother      Asthma Father      Asthma Sister      Hypertension Sister      Asthma Sister      Hypertension Sister      Lupus Sister        MEDICATIONS:  Current Outpatient Prescriptions   Medication Sig Dispense Refill     albuterol (PROAIR HFA;PROVENTIL HFA;VENTOLIN HFA) 90 mcg/actuation inhaler Inhale.       albuterol (VENTOLIN HFA) 90 mcg/actuation inhaler INHALE 1 TO 2 PUFFS BY MOUTH EVERY 4 TO 6 HOURS AS NEEDED 18 g 11     calcipotriene (DOVONOX) 0.005 % cream Apply topically 2 (two) times a day.       clobetasol (TEMOVATE) 0.05 % cream Apply topically 2 (two) times a day.       crisaborole (EUCRISA) 2 % Oint Apply topically 2 (two) times a day.       fexofenadine-pseudoephedrine (ALLEGRA-D 12 HOUR)  mg per tablet TAKE 1 TABLET BY MOUTH TWICE DAILY 180 tablet  3     fluticasone (FLOVENT HFA) 44 mcg/actuation inhaler Inhale 1 puff 2 (two) times a day. 1 Inhaler 11     montelukast (SINGULAIR) 10 mg tablet TAKE 1 TABLET(10 MG) BY MOUTH DAILY 90 tablet 1     norethindrone-ethinyl estradiol (LOESTRIN FE 1/20, 28-DAY,) 1 mg-20 mcg (21)/75 mg (7) per tablet Take by mouth.       predniSONE (DELTASONE) 20 MG tablet Take 2 tablets (40 mg total) by mouth daily. 10 tablet 0     VENTOLIN HFA 90 mcg/actuation inhaler INHALE 1 TO 2 PUFFS BY MOUTH EVERY 4 TO 6 HOURS AS NEEDED 18 g 0     VIVELLE-DOT 0.05 mg/24 hr        X-VIATE 40 % Crea        No current facility-administered medications for this visit.        TOBACCO USE:  History   Smoking Status     Former Smoker     Packs/day: 1.00     Years: 20.00     Types: Cigarettes   Smokeless Tobacco     Never Used     Comment: Quit mid-90s       VITALS:  Vitals:    10/05/18 1530   BP: 114/70   Pulse: 70     Wt Readings from Last 3 Encounters:   07/17/18 180 lb (81.6 kg)   02/15/18 179 lb 4.8 oz (81.3 kg)   01/10/18 180 lb 8 oz (81.9 kg)       PHYSICAL EXAM:  Constitutional:   Reveals a female who appears comfortable.  Vitals: per nursing notes.  Musculoskeletal: No peripheral swelling.  Neuro:  Alert and oriented. Cranial nerves, motor, sensory exams are intact.  No gross focal deficits.  Psychiatric:  Memory intact, mood appropriate.    QUALITY MEASURES:      DATA REVIEWED:

## 2021-06-21 NOTE — LETTER
Letter by Tahir Ruiz MD at      Author: Tahir Ruiz MD Service: -- Author Type: --    Filed:  Encounter Date: 2/19/2021 Status: (Other)         Radha Cooper  [address] [facility] [address]             February 19, 2021         Dear Ms. Kenneth,    Below are the results from your recent visit: Normal mammogram.    Resulted Orders   Mammo Screening Bilateral    Narrative    BILATERAL FULL FIELD DIGITAL SCREENING MAMMOGRAM WITH TOMOSYNTHESIS    Performed on: 2/18/21.      Compared to: 12/12/2019 Mammo Screening Bilateral, 12/10/2018 Mammo   Screening Bilateral, and 10/09/2017 Mammo Screening Bilateral    Findings: The breasts have scattered areas of fibroglandular densities.   There is no radiographic evidence of malignancy. This study was evaluated   with the assistance of Computer-Aided Detection. Breast Tomosynthesis was   used in interpretation. Routine screening mammogram in one year is   recommended.    ACR BI-RADS Category 1: Negative            Please call with questions or contact us using [employer].    Sincerely,        Electronically signed by Tahir Ruiz MD

## 2021-06-23 NOTE — TELEPHONE ENCOUNTER
New Appointment Needed  What is the reason for the visit:    Same Date/Next Day Appt Request  What is the reason for your visit?: Patient wants to schedule her 2nd Shingles shot  Provider Preference: Nursing appointment  How soon do you need to be seen?: When Shingrex vaccine is available  Waitlist offered?: No  Okay to leave a detailed message:  Yes

## 2021-06-23 NOTE — TELEPHONE ENCOUNTER
Refill Approved    Rx renewed per Medication Renewal Policy. Medication was last renewed on 1/10/2018 with 11 refills.  Last office visit:10/5/2018 with PCP Dr RAISA Ruiz .    Paulette Garcia, [facility] Triage/Med Refill 2/3/2019     Requested Prescriptions   Pending Prescriptions Disp Refills     albuterol (VENTOLIN HFA) 90 mcg/actuation inhaler [Pharmacy Med Name: VENTOLIN HFA INH W/DOS CTR 200PUFFS] 18 g 0     Sig: INHALE 1 TO 2 PUFFS BY MOUTH EVERY 4 TO 6 HOURS AS NEEDED    Albuterol/Levalbuterol Refill Protocol Passed - 1/31/2019 11:14 PM       Passed - PCP or prescribing provider visit in last year    Last office visit with prescriber/PCP: 10/5/2018 Tahir Ruiz MD OR same dept: 10/5/2018 Tahir Ruiz MD OR same specialty: 10/5/2018 Tahir Ruiz MD Last physical: 1/10/2018       Next appt within 3 mo: Visit date not found  Next physical within 3 mo: Visit date not found  Prescriber OR PCP: Tahir Ruiz MD  Last diagnosis associated with med order: 1. Asthma  - VENTOLIN HFA 90 mcg/actuation inhaler [Pharmacy Med Name: VENTOLIN HFA INH W/DOS CTR 200PUFFS]; INHALE 1 TO 2 PUFFS BY MOUTH EVERY 4 TO 6 HOURS AS NEEDED  Dispense: 18 g; Refill: 0    If protocol passes may refill for 6 months if within 3 months of last provider visit (or a total of 9 months). If patient requesting >1 inhaler per month refill x 6 months and have patient make appointment with provider.

## 2021-06-23 NOTE — TELEPHONE ENCOUNTER
Controlled Substance Refill Request  Medication:   Requested Prescriptions     Pending Prescriptions Disp Refills     fexofenadine-pseudoephedrine (ALLEGRA-D 12 HOUR)  mg per tablet [Pharmacy Med Name: ALLEGRA-D 12HR TABLETS 30'S (OTC)] 180 tablet 0     Sig: TAKE 1 TABLET BY MOUTH TWICE DAILY     Date Last Fill: 1/10/18  [facility]    Submit electronically to pharmacy  Controlled Substance Agreement on File:   Encounter-Level CSA Scan Date:    There are no encounter-level csa scan date.       Last office visit: Last office visit pertaining to requested medication was 10/5/18.

## 2021-06-24 NOTE — TELEPHONE ENCOUNTER
RN cannot approve Refill Request    RN can NOT refill this medication med is not covered by policy/route to provider     . Last office visit: 10/5/2018 Tahir Ruiz MD Last Physical: 1/10/2018 Last MTM visit: Visit date not found Last visit same specialty: 10/5/2018 Tahir Ruiz MD.  Next visit within 3 mo: Visit date not found  Next physical within 3 mo: Visit date not found      Ethel Ley, [facility] Triage/Med Refill 2/12/2019    Requested Prescriptions   Pending Prescriptions Disp Refills     montelukast (SINGULAIR) 10 mg tablet [Pharmacy Med Name: MONTELUKAST 10MG TABLETS] 90 tablet 0     Sig: TAKE 1 TABLET(10 MG) BY MOUTH DAILY    There is no refill protocol information for this order

## 2021-06-27 ENCOUNTER — HEALTH MAINTENANCE LETTER (OUTPATIENT)
Age: 66
End: 2021-06-27

## 2021-07-13 ENCOUNTER — RECORDS - HEALTHEAST (OUTPATIENT)
Dept: ADMINISTRATIVE | Facility: CLINIC | Age: 66
End: 2021-07-13

## 2021-07-20 ENCOUNTER — TELEPHONE (OUTPATIENT)
Dept: FAMILY MEDICINE | Facility: CLINIC | Age: 66
End: 2021-07-20

## 2021-07-20 DIAGNOSIS — Z13.5 SCREENING FOR EYE CONDITION: Primary | ICD-10-CM

## 2021-07-20 NOTE — TELEPHONE ENCOUNTER
Reason for Call: Request for an order or referral:    Order or referral being requested: Referral to [facility] in [place] for an Eye Exam- Patient's insurance requires it     Date needed: as soon as possible    Has the patient been seen by the PCP for this problem? Not Applicable    Phone number Patient can be reached at:  [phone]    Best Time:  anytime    Can we leave a detailed message on this number?  YES    Call taken on 7/20/2021 at 11:04 AM by Orquidea Mendez

## 2021-07-21 ENCOUNTER — RECORDS - HEALTHEAST (OUTPATIENT)
Dept: ADMINISTRATIVE | Facility: CLINIC | Age: 66
End: 2021-07-21

## 2021-08-20 ENCOUNTER — APPOINTMENT (OUTPATIENT)
Dept: MRI IMAGING | Facility: HOSPITAL | Age: 66
End: 2021-08-20
Attending: EMERGENCY MEDICINE
Payer: COMMERCIAL

## 2021-08-20 ENCOUNTER — HOSPITAL ENCOUNTER (EMERGENCY)
Facility: HOSPITAL | Age: 66
Discharge: HOME | End: 2021-08-20
Attending: EMERGENCY MEDICINE | Admitting: EMERGENCY MEDICINE
Payer: COMMERCIAL

## 2021-08-20 VITALS
SYSTOLIC BLOOD PRESSURE: 135 MMHG | HEART RATE: 78 BPM | HEIGHT: 64 IN | DIASTOLIC BLOOD PRESSURE: 72 MMHG | OXYGEN SATURATION: 96 % | BODY MASS INDEX: 32.1 KG/M2 | RESPIRATION RATE: 22 BRPM

## 2021-08-20 DIAGNOSIS — R42 DIZZINESS: ICD-10-CM

## 2021-08-20 LAB
ANION GAP SERPL CALCULATED.3IONS-SCNC: 10 MMOL/L (ref 5–18)
APTT PPP: 31 SECONDS (ref 22–38)
ATRIAL RATE - MUSE: 75 BPM
BASOPHILS # BLD AUTO: 0 10E3/UL (ref 0–0.2)
BASOPHILS NFR BLD AUTO: 1 %
BUN SERPL-MCNC: 12 MG/DL (ref 8–22)
CALCIUM SERPL-MCNC: 8.8 MG/DL (ref 8.5–10.5)
CHLORIDE BLD-SCNC: 108 MMOL/L (ref 98–107)
CO2 SERPL-SCNC: 23 MMOL/L (ref 22–31)
CREAT SERPL-MCNC: 0.71 MG/DL (ref 0.6–1.1)
DIASTOLIC BLOOD PRESSURE - MUSE: 67 MMHG
EOSINOPHIL # BLD AUTO: 0.2 10E3/UL (ref 0–0.7)
EOSINOPHIL NFR BLD AUTO: 4 %
ERYTHROCYTE [DISTWIDTH] IN BLOOD BY AUTOMATED COUNT: 13 % (ref 10–15)
GFR SERPL CREATININE-BSD FRML MDRD: 88 ML/MIN/1.73M2
GLUCOSE BLD-MCNC: 103 MG/DL (ref 70–125)
GLUCOSE BLDC GLUCOMTR-MCNC: 103 MG/DL (ref 70–125)
HCT VFR BLD AUTO: 42.8 % (ref 35–47)
HGB BLD-MCNC: 14.3 G/DL (ref 11.7–15.7)
IMM GRANULOCYTES # BLD: 0 10E3/UL
IMM GRANULOCYTES NFR BLD: 0 %
INR PPP: 0.97 (ref 0.85–1.15)
INTERPRETATION ECG - MUSE: NORMAL
LYMPHOCYTES # BLD AUTO: 1.5 10E3/UL (ref 0.8–5.3)
LYMPHOCYTES NFR BLD AUTO: 26 %
MCH RBC QN AUTO: 33.7 PG (ref 26.5–33)
MCHC RBC AUTO-ENTMCNC: 33.4 G/DL (ref 31.5–36.5)
MCV RBC AUTO: 101 FL (ref 78–100)
MONOCYTES # BLD AUTO: 0.5 10E3/UL (ref 0–1.3)
MONOCYTES NFR BLD AUTO: 9 %
NEUTROPHILS # BLD AUTO: 3.5 10E3/UL (ref 1.6–8.3)
NEUTROPHILS NFR BLD AUTO: 60 %
NRBC # BLD AUTO: 0 10E3/UL
NRBC BLD AUTO-RTO: 0 /100
P AXIS - MUSE: 42 DEGREES
PLATELET # BLD AUTO: 241 10E3/UL (ref 150–450)
POTASSIUM BLD-SCNC: 4.4 MMOL/L (ref 3.5–5)
PR INTERVAL - MUSE: 170 MS
QRS DURATION - MUSE: 80 MS
QT - MUSE: 372 MS
QTC - MUSE: 415 MS
R AXIS - MUSE: 21 DEGREES
RBC # BLD AUTO: 4.24 10E6/UL (ref 3.8–5.2)
SODIUM SERPL-SCNC: 141 MMOL/L (ref 136–145)
SYSTOLIC BLOOD PRESSURE - MUSE: 130 MMHG
T AXIS - MUSE: 54 DEGREES
TROPONIN I SERPL-MCNC: <0.01 NG/ML (ref 0–0.29)
VENTRICULAR RATE- MUSE: 75 BPM
WBC # BLD AUTO: 5.7 10E3/UL (ref 4–11)

## 2021-08-20 PROCEDURE — 85730 THROMBOPLASTIN TIME PARTIAL: CPT | Performed by: EMERGENCY MEDICINE

## 2021-08-20 PROCEDURE — 36415 COLL VENOUS BLD VENIPUNCTURE: CPT | Performed by: EMERGENCY MEDICINE

## 2021-08-20 PROCEDURE — 85025 COMPLETE CBC W/AUTO DIFF WBC: CPT | Performed by: EMERGENCY MEDICINE

## 2021-08-20 PROCEDURE — 93005 ELECTROCARDIOGRAM TRACING: CPT | Performed by: EMERGENCY MEDICINE

## 2021-08-20 PROCEDURE — 99285 EMERGENCY DEPT VISIT HI MDM: CPT | Mod: 25

## 2021-08-20 PROCEDURE — 96374 THER/PROPH/DIAG INJ IV PUSH: CPT

## 2021-08-20 PROCEDURE — 85610 PROTHROMBIN TIME: CPT | Performed by: EMERGENCY MEDICINE

## 2021-08-20 PROCEDURE — 70551 MRI BRAIN STEM W/O DYE: CPT

## 2021-08-20 PROCEDURE — 96375 TX/PRO/DX INJ NEW DRUG ADDON: CPT

## 2021-08-20 PROCEDURE — 84484 ASSAY OF TROPONIN QUANT: CPT | Performed by: EMERGENCY MEDICINE

## 2021-08-20 PROCEDURE — 250N000011 HC RX IP 250 OP 636: Performed by: EMERGENCY MEDICINE

## 2021-08-20 PROCEDURE — 80048 BASIC METABOLIC PNL TOTAL CA: CPT | Performed by: EMERGENCY MEDICINE

## 2021-08-20 RX ORDER — LORAZEPAM 2 MG/ML
1 INJECTION INTRAMUSCULAR ONCE
Status: COMPLETED | OUTPATIENT
Start: 2021-08-20 | End: 2021-08-20

## 2021-08-20 RX ORDER — ONDANSETRON 2 MG/ML
4 INJECTION INTRAMUSCULAR; INTRAVENOUS ONCE
Status: COMPLETED | OUTPATIENT
Start: 2021-08-20 | End: 2021-08-20

## 2021-08-20 RX ADMIN — ONDANSETRON 4 MG: 2 INJECTION INTRAMUSCULAR; INTRAVENOUS at 07:30

## 2021-08-20 RX ADMIN — LORAZEPAM 1 MG: 2 INJECTION INTRAMUSCULAR; INTRAVENOUS at 07:30

## 2021-08-20 NOTE — DISCHARGE INSTRUCTIONS
Your MRI was normal. We do not think this was a stroke. You should follow up with primary care within one week.

## 2021-08-20 NOTE — ED PROVIDER NOTES
eMERGENCY dEPARTMENT PROGRESS NOTE      Patient was signed out to me by Dr. Viral Fernandez at 7:21 AM to follow up on MRI and labs.     LABS  Pertinent lab results reviewed in chart.  Results for orders placed or performed during the hospital encounter of 08/20/21   MR Brain w/o Contrast    Impression    IMPRESSION:  1.  No finding for acute infarct, intracranial hemorrhage, or extra-axial collection. No significant parenchymal signal abnormality, and no findings to suggest an abnormal mass on this noncontrast brain MRI.    2.  Mild diffuse parenchymal volume loss.   Basic metabolic panel   Result Value Ref Range    Sodium 141 136 - 145 mmol/L    Potassium 4.4 3.5 - 5.0 mmol/L    Chloride 108 (H) 98 - 107 mmol/L    Carbon Dioxide (CO2) 23 22 - 31 mmol/L    Anion Gap 10 5 - 18 mmol/L    Urea Nitrogen 12 8 - 22 mg/dL    Creatinine 0.71 0.60 - 1.10 mg/dL    Calcium 8.8 8.5 - 10.5 mg/dL    Glucose 103 70 - 125 mg/dL    GFR Estimate 88 >60 mL/min/1.73m2   Result Value Ref Range    INR 0.97 0.85 - 1.15   Partial thromboplastin time   Result Value Ref Range    aPTT 31 22 - 38 Seconds   Result Value Ref Range    Troponin I <0.01 0.00 - 0.29 ng/mL   Glucose by meter   Result Value Ref Range    GLUCOSE BY METER POCT 103 70 - 125 mg/dL   CBC with platelets and differential   Result Value Ref Range    WBC Count 5.7 4.0 - 11.0 10e3/uL    RBC Count 4.24 3.80 - 5.20 10e6/uL    Hemoglobin 14.3 11.7 - 15.7 g/dL    Hematocrit 42.8 35.0 - 47.0 %     (H) 78 - 100 fL    MCH 33.7 (H) 26.5 - 33.0 pg    MCHC 33.4 31.5 - 36.5 g/dL    RDW 13.0 10.0 - 15.0 %    Platelet Count 241 150 - 450 10e3/uL    % Neutrophils 60 %    % Lymphocytes 26 %    % Monocytes 9 %    % Eosinophils 4 %    % Basophils 1 %    % Immature Granulocytes 0 %    NRBCs per 100 WBC 0 <1 /100    Absolute Neutrophils 3.5 1.6 - 8.3 10e3/uL    Absolute Lymphocytes 1.5 0.8 - 5.3 10e3/uL    Absolute Monocytes 0.5 0.0 - 1.3 10e3/uL    Absolute Eosinophils 0.2 0.0 - 0.7 10e3/uL     Absolute Basophils 0.0 0.0 - 0.2 10e3/uL    Absolute Immature Granulocytes 0.0 <=0.0 10e3/uL    Absolute NRBCs 0.0 10e3/uL   ECG 12-LEAD WITH MUSE (LHE)   Result Value Ref Range    Systolic Blood Pressure 130 mmHg    Diastolic Blood Pressure 67 mmHg    Ventricular Rate 75 BPM    Atrial Rate 75 BPM    NC Interval 170 ms    QRS Duration 80 ms     ms    QTc 415 ms    P Axis 42 degrees    R AXIS 21 degrees    T Axis 54 degrees    Interpretation ECG       Sinus rhythm  Normal ECG  No previous ECGs available  Confirmed by SEE ED PROVIDER NOTE FOR, ECG INTERPRETATION ([id number]),  ADRIÁN BALLARD ([id number]) on 8/20/2021 8:52:25 AM         EKG  EKG shows sinus rhythm of a normal rate. No ST elevation or depression. Normal intervals. Done at 0847 with HR of 75.     RADIOLOGY  MRI without findings of CVA.    PROCEDURES   none    ED COURSE & MEDICAL DECISION MAKING    66 year old female presented with concern for a stroke. She had atypical symptoms of feeling bigger and taller than usual. She also had more dizziness than usual. Was given Ativan for MRI and now feels better. MRI with no finding of stroke.  Her EKG is sinus.  Labs are all reassuring.  Ambulated without difficulty.  Do not have a clear etiology for her symptoms but could be peripheral vertigo.  Also has history of ocular migraines.  Follow-up with primary care.  Return precautions in place.    9:47 AM Discussed findings with patient. She is feeling better. Ok for discharge.    At the conclusion of the encounter I discussed  the results of all of the tests and the disposition.   The questions were answered.  The patient or family acknowledged understanding and was agreeable with the care plan.     FINAL IMPRESSION      1. Dizziness      Rosalee Vigil MD  [facility] emergency department       Rosalee Vigil MD  08/20/21 [id number]

## 2021-08-20 NOTE — ED NOTES
Received report and there has not been a neuro assessment by a nurse, on our way to MRI to do assessment and administer zofran for nausea.

## 2021-08-20 NOTE — ED NOTES
Pt returned from MRI; no pain, no dizziness upon movement of head of bed. Pt states that she is feeling better, but drowsy from the meds. VSS.

## 2021-08-20 NOTE — ED NOTES
Pt care and report taken from charge nurse on Radha, a tier 2 stroke code that began care with swat nurse in hallway of ED due to no room availability; this patient was taken to MRI where primary nurse Christine are en route to give meds and do neuro assessment.

## 2021-08-20 NOTE — ED PROVIDER NOTES
EMERGENCY DEPARTMENT ENCOUNTER      NAME: Radha Cooper  AGE: 66 year old female  YOB: 1955  MRN: [id number]  EVALUATION DATE & TIME: 8/20/2021  7:01 AM    PCP: Tahir Ruiz    ED PROVIDER: Viral Fernanedz M.D.      No chief complaint on file.        FINAL IMPRESSION:  Dizziness      ED COURSE & MEDICAL DECISION MAKING:    Pertinent Labs & Imaging studies reviewed. (See chart for details)  66 year old female presents to the Emergency Department for evaluation of dizziness.  Patient had gone to bed about midnight and awakened this morning feeling somewhat out of sorts.  She is quite dizzy and had a sensation of movement.  With this there is also some nausea suggesting vertigo.  However patient also reported that she felt her extremities were too large and had difficulty controlling them.  EMS called and made the patient a stroke code in route given her symptomatology.  Patient is very concerned it may be a stroke as her mother had 1 when she was in her 60s also.  Patient without significant stroke or cardial vascular risk factors.  No hypertension, no diabetes no dyslipidemia.  On exam she is a moderately obese female in mild distress.  Face is symmetrical and active.  There is no ulnar drift.  Finger-nose-finger exam is negative.  Coordination and strength of lower extremities are negative.  NIH score is essentially 0.  Out of caution we will obtain an MRI and patient was mated tier 2 stroke code.  Patient treated symptomatically with intravenous Ativan both for her dizziness and to help with her MRI.  Baseline blood work being obtained.. Patient appears non toxic with stable vitals signs. Overall exam is benign.        7:05 AM I met with the patient for the initial interview and physical examination. Discussed plan for treatment and workup in the ED.    7:15 AM.  Patient discussed with  at end of shift.  She has consulted neurology and will proceed with evaluation.  Disposition to be  "determined by evaluation findings and response to interventions.  At the conclusion of the encounter I discussed the results of all of the tests and the disposition. The questions were answered and return precautions provided. The patient or family acknowledged understanding and was agreeable with the care plan.       PPE: Provider wore gloves, , eye protection and paper mask     MEDICATIONS GIVEN IN THE EMERGENCY:  Medications   LORazepam (ATIVAN) injection 1 mg (has no administration in time range)   ondansetron (ZOFRAN) injection 4 mg (has no administration in time range)       NEW PRESCRIPTIONS STARTED AT TODAY'S ER VISIT  New Prescriptions    No medications on file          =================================================================    HPI    Patient information was obtained from: Patient    Use of Intrepreter: N/A        Radha Cooper is a 66 year old female with a pertient medical history of asthma and psoriasis who presents to the ED for evaluation of a stroke code.    Per EMS, for the past couple days, the patient has been feeling tired, and last night (8/19), the patient went to bed at 0000. At 0615 this morning, she woke up to go the bathroom and couldn't move. She stated that her brain thought her limbs were bigger than they were. In addition, she endorses dizziness and vertigo. The patient has been hypertensive in the 170-190s on the EMS transport.    Per patient, her whole body feels weird, and she endorses dizziness that makes her feel really tall. This dizziness grows in severity with movement. Additionally, the patient reports that there seems to be a delay between her thoughts and her ability to make words, as if there is a physical disconnect. The patient denies nausea or tinnitus.       REVIEW OF SYSTEMS   GI:  Denies nausea  HENT: Denies tinnitus  Neurologic:  Positive for dizziness and a \"physical disconnect\" between thoughts and words.  All systems negative except as marked.     PAST " MEDICAL HISTORY:  Past Medical History:   Diagnosis Date     Shingles     Right leg, Right low back       PAST SURGICAL HISTORY:  Past Surgical History:   Procedure Laterality Date     ANKLE SURGERY Right     [id number]     CATARACT EXTRACTION Right 2015     HYSTERECTOMY  2010    Supracervical Hysterectomy Laparoscopic. With BSO.     OOPHORECTOMY  2010     TONSILLECTOMY      Description: Tonsillectomy;  Recorded: 04/13/2009;     TUBAL LIGATION      Description: Tubal Ligation;  Recorded: 04/13/2009;         CURRENT MEDICATIONS:      Current Facility-Administered Medications:      LORazepam (ATIVAN) injection 1 mg, 1 mg, Intravenous, Once, Viral Fernandez MD     ondansetron (ZOFRAN) injection 4 mg, 4 mg, Intravenous, Once, Viral Fernandez MD    Current Outpatient Medications:      albuterol (PROAIR HFA;PROVENTIL HFA;VENTOLIN HFA) 90 mcg/actuation inhaler, [ALBUTEROL (PROAIR HFA;PROVENTIL HFA;VENTOLIN HFA) 90 MCG/ACTUATION INHALER] INHALE 1 TO 2 PUFFS BY MOUTH EVERY 4- 6 HOURS AS NEEDED, Disp: 18 g, Rfl: 5     calcipotriene (DOVONOX) 0.005 % cream, [CALCIPOTRIENE (DOVONOX) 0.005 % CREAM] Apply topically 2 (two) times a day., Disp: , Rfl:      clobetasol (TEMOVATE) 0.05 % cream, [CLOBETASOL (TEMOVATE) 0.05 % CREAM] Apply topically 2 (two) times a day., Disp: , Rfl:      crisaborole (EUCRISA) 2 % Oint, [CRISABOROLE (EUCRISA) 2 % OINT] Apply topically 2 (two) times a day., Disp: , Rfl:      fexofenadine-pseudoephedrine (ALLEGRA-D 12 HOUR)  mg per tablet, [FEXOFENADINE-PSEUDOEPHEDRINE (ALLEGRA-D 12 HOUR)  MG PER TABLET] TAKE 1 TABLET BY MOUTH TWICE DAILY, Disp: 180 tablet, Rfl: 0     fluticasone propionate (FLONASE) 50 mcg/actuation nasal spray, [FLUTICASONE PROPIONATE (FLONASE) 50 MCG/ACTUATION NASAL SPRAY] SHAKE LIQUID AND USE 2 SPRAYS IN EACH NOSTRIL DAILY, Disp: 16 g, Rfl: 3     fluticasone propionate (FLOVENT HFA) 44 mcg/actuation inhaler, [FLUTICASONE PROPIONATE (FLOVENT HFA) 44 MCG/ACTUATION  INHALER] Inhale 1 puff 2 (two) times a day., Disp: 1 Inhaler, Rfl: 5     montelukast (SINGULAIR) 10 mg tablet, [MONTELUKAST (SINGULAIR) 10 MG TABLET] Take 1 tablet (10 mg total) by mouth daily., Disp: 90 tablet, Rfl: 1     VIVELLE-DOT 0.05 mg/24 hr, [VIVELLE-DOT 0.05 MG/24 HR] , Disp: , Rfl:     ALLERGIES:  Allergies   Allergen Reactions     Penicillins Rash     rash--in childhood       Shellfish Containing Products [Shellfish-Derived Products] Other (See Comments)     Abdominal pain         FAMILY HISTORY:  Family History   Problem Relation Age of Onset     Ovarian Cancer Maternal Aunt 60.00        Mother's half sister     Breast Cancer Cousin 40.00     Coronary Artery Disease Mother 62.00     CABG Mother         80s     Hypertension Mother      Asthma Father      Asthma Sister      Hypertension Sister      Asthma Sister      Hypertension Sister      Lupus Sister        SOCIAL HISTORY:   Social History     Socioeconomic History     Marital status:      Spouse name: Not on file     Number of children: 0     Years of education: Not on file     Highest education level: Not on file   Occupational History     Not on file   Tobacco Use     Smoking status: Former Smoker     Packs/day: 1.00     Years: 20.00     Pack years: 20.00     Types: Cigarettes, Cigarettes     Smokeless tobacco: Never Used     Tobacco comment: Quit mid-90s   Substance and Sexual Activity     Alcohol use: Yes     Alcohol/week: 7.0 standard drinks     Drug use: No     Sexual activity: Yes     Partners: Male   Other Topics Concern     Not on file   Social History Narrative    Diet- Regular, fish, chicken.    Exercise- treadmill, walk daily, body classes     -meningioma     Social Determinants of Health     Financial Resource Strain:      Difficulty of Paying Living Expenses:    Food Insecurity:      Worried About Running Out of Food in the Last Year:      Ran Out of Food in the Last Year:    Transportation Needs:      Lack of  Transportation (Medical):      Lack of Transportation (Non-Medical):    Physical Activity:      Days of Exercise per Week:      Minutes of Exercise per Session:    Stress:      Feeling of Stress :    Social Connections:      Frequency of Communication with Friends and Family:      Frequency of Social Gatherings with Friends and Family:      Attends Taoism Services:      Active Member of Clubs or Organizations:      Attends Club or Organization Meetings:      Marital Status:    Intimate Partner Violence:      Fear of Current or Ex-Partner:      Emotionally Abused:      Physically Abused:      Sexually Abused:        VITALS:  BP (!) 147/85   Pulse 85   Resp 16   SpO2 97%      PHYSICAL EXAM    Constitutional:  Awake, alert, in mild apparent distress  HENT:  Normocephalic, Atraumatic. Bilateral external ears normal. Oropharynx moist. Nose normal. Neck- Normal range of motion with no guarding, No midline cervical tenderness, Supple, No stridor.   Eyes:  PERRL, EOMI with no signs of entrapment, Conjunctiva normal, No discharge.   Respiratory:  Normal breath sounds, No respiratory distress, No wheezing.    Cardiovascular:  Normal heart rate, Normal rhythm, No appreciable rubs or gallops.   GI:  Soft, No tenderness, No distension, No palpable masses  Musculoskeletal:  Intact distal pulses, No edema. Good range of motion in all major joints. No tenderness to palpation or major deformities noted.  Integument:  Warm, Dry, No erythema, No rash.   Neurologic:  Alert & oriented, Normal motor function, Normal sensory function, No focal deficits noted.   Psychiatric:  Affect normal, Judgment normal, Mood normal.     LAB:  All pertinent labs reviewed and interpreted.  Results for orders placed or performed during the hospital encounter of 08/20/21   Glucose by meter   Result Value Ref Range    GLUCOSE BY METER POCT 103 70 - 125 mg/dL       RADIOLOGY:  Reviewed all pertinent imaging. Please see official radiology report.    Brain w/o Contrast    (Results Pending)           I, Viola Pretty, am serving as a scribe to document services personally performed by Viral Fernandez MD, based on my observation and the provider's statements to me. I, Viral Fernandez MD attest that Viola Pretty is acting in a scribe capacity, has observed my performance of the services and has documented them in accordance with my direction.    Viral Fernandez M.D.  Emergency Medicine  [facility] EMERGENCY DEPARTMENT       Viral Fernandez MD  08/20/21 0750

## 2021-08-20 NOTE — ED NOTES
Patient ambulated to bathroom with assistance of spouse and ED tech, patient wanted assistance due to anxiety. Patient has no deficits and VSS. After care instructions discussed with patient and spouse, all questions and concerns addressed. Patient condition at discharge is stable.  Mackenzie Burrell RN

## 2021-08-25 ENCOUNTER — OFFICE VISIT (OUTPATIENT)
Dept: FAMILY MEDICINE | Facility: CLINIC | Age: 66
End: 2021-08-25
Payer: COMMERCIAL

## 2021-08-25 VITALS
WEIGHT: 185.44 LBS | DIASTOLIC BLOOD PRESSURE: 72 MMHG | SYSTOLIC BLOOD PRESSURE: 130 MMHG | BODY MASS INDEX: 31.83 KG/M2 | HEART RATE: 90 BPM | OXYGEN SATURATION: 96 %

## 2021-08-25 DIAGNOSIS — R20.2 PARESTHESIA: Primary | ICD-10-CM

## 2021-08-25 PROCEDURE — 99214 OFFICE O/P EST MOD 30 MIN: CPT | Performed by: FAMILY MEDICINE

## 2021-08-25 NOTE — PROGRESS NOTES
"    Assessment & Plan     Paresthesia  Patient with an acute episode of numbness sensation in the hands and feet now resolved no evidence of any stroke, I do believe that underlying psychosocial stress is playing a role.    PLAN:  1.  Overall reassurance, I told the patient that we do not need to do any further work-up.  2.  Patient be due for a physical in the next 6 to 9 months.  3.  Of note, 31 minutes were spent with the patient reviewing the emergency room visit, discussing her underlying medical condition.   6}     BMI:   Estimated body mass index is 31.83 kg/m  as calculated from the following:    Height as of 8/20/21: 1.626 m (5' 4\").    Weight as of this encounter: 84.1 kg (185 lb 7 oz).       See Patient Instructions    No follow-ups on file.    Tahir Ruiz MD  [facility]    Raj Garcia is a 66 year old who presents for the following health issues.    HPI comes in with her  for follow-up emergency room visit.  Patient woke up on the morning of August 28, she felt like her hands and legs just were not working well sensation of numbness, it was not weakness per se she thought she had trouble articulating some speech but bystanders did not 911 was called she was brought to the emergency room.  She had extensive evaluation including an MRI of the brain, EKG and laboratory studies all of these were normal.    I explained to the patient that given the work-up and also given the symptoms she was having that is bilateral upper and lower extremity this would not be consistent with a stroke and there is no evidence of this.    Patient does admit since Covid she has been under considerable amount of stress she is very worried about getting sick she is extremely careful and diligent.  Her  does also agree that she is under more stress.  Tried to emphasize measures she can take to help reduce this such as exercise eating well adequate sleep and so " forth.    Patient was scheduled to go to why he, she may cancel that but this would be mainly out of concern about Covid.  I explained to the patient that given her recent episode in the emergency room this is not a reason not to travel if she decides not to travel I would basis on considerations of Covid.         Review of Systems   Constitutional, HEENT, cardiovascular, pulmonary, GI, , musculoskeletal, neuro, skin, endocrine and psych systems are negative, except as otherwise noted.      Objective    /72 (BP Location: Right arm, Patient Position: Sitting, Cuff Size: Adult Large)   Pulse 90   Wt 84.1 kg (185 lb 7 oz)   SpO2 96%   BMI 31.83 kg/m    Body mass index is 31.83 kg/m .  Physical Exam   GENERAL: healthy, alert and no distress  EYES: Eyes grossly normal to inspection, PERRL and conjunctivae and sclerae normal  MS: no gross musculoskeletal defects noted, no edema  SKIN: no suspicious lesions or rashes   NEURO: Normal strength and tone, mentation intact and speech normal  PSYCH: mentation appears normal, affect normal/bright    I reviewed the MRI of the brain which was essentially negative for any evidence of a stroke, twelve-lead EKG which was normal sinus rhythm also from August 20 and laboratory studies including CBC troponin basic metabolic profile all within normal limits.

## 2021-10-17 ENCOUNTER — HEALTH MAINTENANCE LETTER (OUTPATIENT)
Age: 66
End: 2021-10-17

## 2021-10-21 ENCOUNTER — IMMUNIZATION (OUTPATIENT)
Dept: NURSING | Facility: CLINIC | Age: 66
End: 2021-10-21
Payer: COMMERCIAL

## 2021-10-21 PROCEDURE — 91300 PR COVID VAC PFIZER DIL RECON 30 MCG/0.3 ML IM: CPT

## 2021-10-21 PROCEDURE — 0004A PR COVID VAC PFIZER DIL RECON 30 MCG/0.3 ML IM: CPT

## 2021-10-23 DIAGNOSIS — J45.909 ASTHMA: ICD-10-CM

## 2021-10-24 ENCOUNTER — MYC REFILL (OUTPATIENT)
Dept: FAMILY MEDICINE | Facility: CLINIC | Age: 66
End: 2021-10-24

## 2021-10-24 DIAGNOSIS — J45.20 MILD INTERMITTENT ASTHMA WITHOUT COMPLICATION: Primary | ICD-10-CM

## 2021-10-24 DIAGNOSIS — J45.909 ASTHMA: ICD-10-CM

## 2021-10-24 DIAGNOSIS — Z88.9 MULTIPLE ALLERGIES: ICD-10-CM

## 2021-10-25 RX ORDER — ALBUTEROL SULFATE 90 UG/1
AEROSOL, METERED RESPIRATORY (INHALATION)
Qty: 18 G | Refills: 5 | Status: SHIPPED | OUTPATIENT
Start: 2021-10-25 | End: 2021-11-02

## 2021-10-25 NOTE — TELEPHONE ENCOUNTER
"Routing refill request to provider for review/approval because:  ACT score    Last Written Prescription Date:  4/19/21  Last Fill Quantity: 18 g,  # refills: 5   Last office visit provider:  8/25/21     Requested Prescriptions   Pending Prescriptions Disp Refills     albuterol (PROAIR HFA/PROVENTIL HFA/VENTOLIN HFA) 108 (90 Base) MCG/ACT inhaler [Pharmacy Med Name: ALBUTEROL HFA INH(200 PUFFS)18GM] 18 g 5     Sig: INHALE 1 TO 2 PUFFS BY MOUTH EVERY 4 TO 6 HOURS AS NEEDED       Asthma Maintenance Inhalers - Anticholinergics Failed - 10/23/2021  2:03 PM        Failed - Asthma control assessment score within normal limits in last 6 months     Please review ACT score.           Passed - Patient is age 12 years or older        Passed - Medication is active on med list        Passed - Recent (6 mo) or future (30 days) visit within the authorizing provider's specialty     Patient had office visit in the last 6 months or has a visit in the next 30 days with authorizing provider or within the authorizing provider's specialty.  See \"Patient Info\" tab in inbasket, or \"Choose Columns\" in Meds & Orders section of the refill encounter.           Short-Acting Beta Agonist Inhalers Protocol  Failed - 10/23/2021  2:03 PM        Failed - Asthma control assessment score within normal limits in last 6 months     Please review ACT score.           Passed - Patient is age 12 or older        Passed - Medication is active on med list        Passed - Recent (6 mo) or future (30 days) visit within the authorizing provider's specialty     Patient had office visit in the last 6 months or has a visit in the next 30 days with authorizing provider or within the authorizing provider's specialty.  See \"Patient Info\" tab in inbasket, or \"Choose Columns\" in Meds & Orders section of the refill encounter.                 Collin Ba RN 10/25/21 11:28 AM  "

## 2021-10-26 RX ORDER — ALBUTEROL SULFATE 90 UG/1
2 AEROSOL, METERED RESPIRATORY (INHALATION) EVERY 4 HOURS PRN
Qty: 18 G | Refills: 5 | Status: SHIPPED | OUTPATIENT
Start: 2021-10-26 | End: 2022-04-26

## 2021-10-26 RX ORDER — FEXOFENADINE HCL AND PSEUDOEPHEDRINE HCI 60; 120 MG/1; MG/1
1 TABLET, EXTENDED RELEASE ORAL 2 TIMES DAILY
Qty: 180 TABLET | Refills: 0 | Status: SHIPPED | OUTPATIENT
Start: 2021-10-26 | End: 2021-11-02

## 2021-10-26 NOTE — TELEPHONE ENCOUNTER
"Routing refill request to provider for review/approval because:  ACT Score  Recently refilled.     Last Written Prescription Date:  10/25/2021  Last Fill Quantity: 18 g,  # refills: 5   Last office visit provider:  8/25/2021     Requested Prescriptions   Pending Prescriptions Disp Refills     albuterol (PROAIR HFA/PROVENTIL HFA/VENTOLIN HFA) 108 (90 Base) MCG/ACT inhaler 18 g 5       Asthma Maintenance Inhalers - Anticholinergics Failed - 10/24/2021 11:28 AM        Failed - Asthma control assessment score within normal limits in last 6 months     Please review ACT score.           Passed - Patient is age 12 years or older        Passed - Medication is active on med list        Passed - Recent (6 mo) or future (30 days) visit within the authorizing provider's specialty     Patient had office visit in the last 6 months or has a visit in the next 30 days with authorizing provider or within the authorizing provider's specialty.  See \"Patient Info\" tab in inbasket, or \"Choose Columns\" in Meds & Orders section of the refill encounter.           Short-Acting Beta Agonist Inhalers Protocol  Failed - 10/24/2021 11:28 AM        Failed - Asthma control assessment score within normal limits in last 6 months     Please review ACT score.           Passed - Patient is age 12 or older        Passed - Medication is active on med list        Passed - Recent (6 mo) or future (30 days) visit within the authorizing provider's specialty     Patient had office visit in the last 6 months or has a visit in the next 30 days with authorizing provider or within the authorizing provider's specialty.  See \"Patient Info\" tab in inbasket, or \"Choose Columns\" in Meds & Orders section of the refill encounter.            Routing refill request to provider for review/approval because:  Drug not on the G refill protocol   Refill too soon    Last Written Prescription Date:  10/18/2021  Last Fill Quantity: 180,  # refills: 0        " fexofenadine-pseudoePHEDrine (ALLEGRA-D ALLERGY & CONGESTION)  MG 12 hr tablet 180 tablet 0     Sig: Take 1 tablet by mouth 2 times daily       There is no refill protocol information for this order          Carrol Almanza RN 10/25/21 11:01 PM

## 2021-11-02 DIAGNOSIS — Z88.9 MULTIPLE ALLERGIES: ICD-10-CM

## 2021-11-02 RX ORDER — FEXOFENADINE HCL AND PSEUDOEPHEDRINE HCI 60; 120 MG/1; MG/1
1 TABLET, EXTENDED RELEASE ORAL 2 TIMES DAILY
Qty: 180 TABLET | Refills: 0 | Status: SHIPPED | OUTPATIENT
Start: 2021-11-02 | End: 2021-11-17

## 2021-11-07 DIAGNOSIS — Z91.09 ENVIRONMENTAL ALLERGIES: ICD-10-CM

## 2021-11-09 RX ORDER — MONTELUKAST SODIUM 10 MG/1
TABLET ORAL
Qty: 90 TABLET | Refills: 1 | Status: SHIPPED | OUTPATIENT
Start: 2021-11-09 | End: 2022-05-13

## 2021-11-09 NOTE — TELEPHONE ENCOUNTER
"Routing refill request to provider for review/approval because:  ACT score    Last Written Prescription Date:  5/18/21  Last Fill Quantity: 90,  # refills: 1   Last office visit provider:  8/25/21     Requested Prescriptions   Pending Prescriptions Disp Refills     montelukast (SINGULAIR) 10 MG tablet [Pharmacy Med Name: MONTELUKAST 10MG TABLETS] 90 tablet 1     Sig: TAKE 1 TABLET(10 MG) BY MOUTH DAILY       Leukotriene Inhibitors Protocol Failed - 11/7/2021 10:24 AM        Failed - Asthma control assessment score within normal limits in last 6 months     Please review ACT score.           Passed - Patient is age 12 or older     If patient is under 16, ok to refill using age based dosing.           Passed - Medication is active on med list        Passed - Recent (6 mo) or future (30 days) visit within the authorizing provider's specialty     Patient had office visit in the last 6 months or has a visit in the next 30 days with authorizing provider or within the authorizing provider's specialty.  See \"Patient Info\" tab in inbasket, or \"Choose Columns\" in Meds & Orders section of the refill encounter.                 Collin Ba RN 11/09/21 10:20 AM  "

## 2021-11-11 DIAGNOSIS — Z88.9 MULTIPLE ALLERGIES: ICD-10-CM

## 2021-11-13 RX ORDER — FEXOFENADINE HYDROCHLORIDE AND PSEUDOEPHEDRINE HYDROCHLORIDE 60; 120 MG/1; MG/1
TABLET, FILM COATED, EXTENDED RELEASE ORAL
Qty: 180 TABLET | OUTPATIENT
Start: 2021-11-13

## 2021-11-13 NOTE — TELEPHONE ENCOUNTER
Refused refill as requesting too soon. Last filled 11/2/2021 Disp 180 no refills.   Deysi Moya RN   11/13/21 12:01 AM  [facility] Nurse Advisor

## 2021-11-16 DIAGNOSIS — Z88.9 MULTIPLE ALLERGIES: ICD-10-CM

## 2021-11-17 RX ORDER — FEXOFENADINE HYDROCHLORIDE AND PSEUDOEPHEDRINE HYDROCHLORIDE 60; 120 MG/1; MG/1
TABLET, FILM COATED, EXTENDED RELEASE ORAL
Qty: 180 TABLET | Refills: 0 | Status: SHIPPED | OUTPATIENT
Start: 2021-11-17 | End: 2022-04-21

## 2021-11-17 NOTE — TELEPHONE ENCOUNTER
Routing refill request to provider for review/approval because:  Drug not on the [facility] refill protocol   Local print     Last Written Prescription Date:  11/2/21  Last Fill Quantity: 180,  # refills: 0   Last office visit provider: 8/25/21      Requested Prescriptions   Pending Prescriptions Disp Refills     ALLEGRA-D ALLERGY & CONGESTION  MG 12 hr tablet [Pharmacy Med Name: ALLEGRA-D 12HR TABLETS 30'S (OTC)] 180 tablet      Sig: TAKE 1 TABLET BY MOUTH TWICE DAILY       There is no refill protocol information for this order          Vero Nagy RN 11/17/21 3:28 PM

## 2021-12-12 ENCOUNTER — HEALTH MAINTENANCE LETTER (OUTPATIENT)
Age: 66
End: 2021-12-12

## 2021-12-30 ENCOUNTER — TELEPHONE (OUTPATIENT)
Dept: FAMILY MEDICINE | Facility: CLINIC | Age: 66
End: 2021-12-30
Payer: COMMERCIAL

## 2021-12-30 NOTE — TELEPHONE ENCOUNTER
Referral Request or Follow Up  Type of referral: Ob-Gyn referral   Who s requesting: patient  Reason for the request: Changed insurance to [employer]   Do you have an appointment scheduled? Yes  What facility are you being referred to? [facility] in [facility]  Do you have any questions? No  Okay to leave a detailed message?  Yes at Cell number on file:    Telephone Information:   Mobile [phone]      · Likely secondary to chronic disease  · Hemoglobin 8 6 today  · Heme test negative for occult blood  · Follow-up iron studies

## 2022-01-03 DIAGNOSIS — Z76.89 REFERRAL OF PATIENT: Primary | ICD-10-CM

## 2022-02-15 ASSESSMENT — ACTIVITIES OF DAILY LIVING (ADL): CURRENT_FUNCTION: NO ASSISTANCE NEEDED

## 2022-02-18 ENCOUNTER — OFFICE VISIT (OUTPATIENT)
Dept: FAMILY MEDICINE | Facility: CLINIC | Age: 67
End: 2022-02-18
Payer: COMMERCIAL

## 2022-02-18 VITALS
HEART RATE: 83 BPM | DIASTOLIC BLOOD PRESSURE: 80 MMHG | BODY MASS INDEX: 31.5 KG/M2 | WEIGHT: 184.5 LBS | HEIGHT: 64 IN | OXYGEN SATURATION: 95 % | SYSTOLIC BLOOD PRESSURE: 121 MMHG

## 2022-02-18 DIAGNOSIS — Z00.00 WELLNESS EXAMINATION: ICD-10-CM

## 2022-02-18 DIAGNOSIS — J45.30 MILD PERSISTENT ASTHMA WITHOUT COMPLICATION: ICD-10-CM

## 2022-02-18 DIAGNOSIS — L40.9 PSORIASIS: ICD-10-CM

## 2022-02-18 DIAGNOSIS — J30.1 ALLERGIC RHINITIS DUE TO POLLEN, UNSPECIFIED SEASONALITY: ICD-10-CM

## 2022-02-18 DIAGNOSIS — Z13.820 SCREENING FOR OSTEOPOROSIS: ICD-10-CM

## 2022-02-18 DIAGNOSIS — Z13.220 SCREENING FOR LIPID DISORDERS: ICD-10-CM

## 2022-02-18 DIAGNOSIS — Z11.59 NEED FOR HEPATITIS C SCREENING TEST: Primary | ICD-10-CM

## 2022-02-18 LAB
CHOLEST SERPL-MCNC: 215 MG/DL
FASTING STATUS PATIENT QL REPORTED: YES
FASTING STATUS PATIENT QL REPORTED: YES
GLUCOSE BLD-MCNC: 100 MG/DL (ref 70–125)
HDLC SERPL-MCNC: 74 MG/DL
LDLC SERPL CALC-MCNC: 122 MG/DL
TRIGL SERPL-MCNC: 94 MG/DL

## 2022-02-18 PROCEDURE — 36415 COLL VENOUS BLD VENIPUNCTURE: CPT | Performed by: FAMILY MEDICINE

## 2022-02-18 PROCEDURE — 80061 LIPID PANEL: CPT | Performed by: FAMILY MEDICINE

## 2022-02-18 PROCEDURE — 82947 ASSAY GLUCOSE BLOOD QUANT: CPT | Performed by: FAMILY MEDICINE

## 2022-02-18 PROCEDURE — 99397 PER PM REEVAL EST PAT 65+ YR: CPT | Performed by: FAMILY MEDICINE

## 2022-02-18 ASSESSMENT — ACTIVITIES OF DAILY LIVING (ADL): CURRENT_FUNCTION: NO ASSISTANCE NEEDED

## 2022-02-18 ASSESSMENT — ASTHMA QUESTIONNAIRES: ACT_TOTALSCORE: 19

## 2022-02-18 NOTE — PROGRESS NOTES
"SUBJECTIVE:   Radha Cooper is a 66 year old female who presents for Preventive Visit.  Patient has been advised of split billing requirements and indicates understanding: Yes  Are you in the first 12 months of your Medicare coverage?  No      HPI  Patient comes in for a Medicare wellness examination.  The patient has a history of underlying asthma and allergies both are very well controlled, this is really not a concern for her she is allergic to a number of substances and has been tested previously.    Patient is up-to-date on all preventive maintenance issues she sees OB/GYN, all of her immunizations are up-to-date she is going to get a mammogram in the near future.  Her blood pressure is incidentally extremely well controlled.    The main concern the patient has is actually the fact that her  had a meningioma this was treated and he had surgical resection of this but this is unfortunately caused a significant change in his overall personality he is much more difficult to be around and this is causing a significant amount of stress.  Patient's dog  recently her brother-in-law  recently all of this is causing increased stress and she has been less physically active as well.              Healthy Habits:     In general, how would you rate your overall health?  Fair    Frequency of exercise:  2-3 days/week    Duration of exercise:  15-30 minutes    Do you usually eat at least 4 servings of fruit and vegetables a day, include whole grains    & fiber and avoid regularly eating high fat or \"junk\" foods?  No    Taking medications regularly:  Yes    Medication side effects:  None    Ability to successfully perform activities of daily living:  No assistance needed    Home Safety:  No safety concerns identified    Hearing Impairment:  No hearing concerns    In the past 6 months, have you been bothered by leaking of urine? Yes    In general, how would you rate your overall mental or emotional health?  Fair  "     PHQ-2 Total Score: 2    Do you feel safe in your environment? Yes    Have you ever done Advance Care Planning? (For example, a Health Directive, POLST, or a discussion with a medical provider or your loved ones about your wishes): No, advance care planning information given to patient to review.  Advanced care planning was discussed at today's visit.       Fall risk  Completed     click delete button to remove this line now  Cognitive Screening   1) Repeat 3 items (Leader, Season, Table)      2) Clock draw:   NORMAL  3) 3 item recall:   Recalls 3 objects  Results: 3 items recalled: COGNITIVE IMPAIRMENT LESS LIKELY    Mini-CogTM Copyright S Saba. Licensed by the author for use in [facility]; reprinted with permission (soob@.[email or web address]). All rights reserved.      Do you have sleep apnea, excessive snoring or daytime drowsiness?: no    Reviewed and updated as needed this visit by clinical staff                  Reviewed and updated as needed this visit by Provider                 Social History     Tobacco Use     Smoking status: Former Smoker     Packs/day: 1.00     Years: 20.00     Pack years: 20.00     Types: Cigarettes, Cigarettes     Smokeless tobacco: Never Used     Tobacco comment: Quit mid-90s   Substance Use Topics     Alcohol use: Yes     Alcohol/week: 14.0 standard drinks     Types: 14 Glasses of wine per week         Alcohol Use 2/15/2022   Prescreen: >3 drinks/day or >7 drinks/week? No                Current providers sharing in care for this patient include:    Patient Care Team:  Tahir Ruiz MD as PCP - General (Family Practice)  Tahir Ruiz MD as Assigned PCP    The following health maintenance items are reviewed in Epic and correct as of today:  Health Maintenance Due   Topic Date Due     DEXA  Never done     ANNUAL REVIEW OF HM ORDERS  Never done     ASTHMA ACTION PLAN  Never done     HEPATITIS C SCREENING  Never done     LUNG CANCER SCREENING  Never done     ASTHMA CONTROL  "TEST  03/21/2021     INFLUENZA VACCINE (1) 09/01/2021     MEDICARE ANNUAL WELLNESS VISIT  09/21/2021     FALL RISK ASSESSMENT  09/21/2021     Lab work is in process       FHS-7:   Breast CA Risk Assessment (FHS-7) 2/15/2022   Did any of your first-degree relatives have breast or ovarian cancer? No   Did any of your relatives have bilateral breast cancer? Yes   Did any man in your family have breast cancer? No   Did any woman in your family have breast and ovarian cancer? Yes   Did any woman in your family have breast cancer before age 50 y? Yes   Do you have 2 or more relatives with breast and/or ovarian cancer? Yes   Do you have 2 or more relatives with breast and/or bowel cancer? Yes     click delete button to remove this line now  Mammogram Screening: Recommended mammography every 1-2 years with patient discussion and risk factor consideration  Pertinent mammograms are reviewed under the imaging tab.    Review of Systems  Constitutional, HEENT, cardiovascular, pulmonary, GI, , musculoskeletal, neuro, skin, endocrine and psych systems are negative, except as otherwise noted.    OBJECTIVE:   There were no vitals taken for this visit. Estimated body mass index is 31.83 kg/m  as calculated from the following:    Height as of 8/20/21: 1.626 m (5' 4\").    Weight as of 8/25/21: 84.1 kg (185 lb 7 oz).  Physical Exam  GENERAL: healthy, alert and no distress  EYES: Eyes grossly normal to inspection, PERRL and conjunctivae and sclerae normal  HENT: ear canals and TM's normal, nose and mouth without ulcers or lesions  NECK: no adenopathy, no asymmetry, masses, or scars and thyroid normal to palpation  RESP: lungs clear to auscultation - no rales, rhonchi or wheezes  BREAST: normal without masses, tenderness or nipple discharge and no palpable axillary masses or adenopathy  CV: regular rate and rhythm, normal S1 S2, no S3 or S4, no murmur, click or rub, no peripheral edema and peripheral pulses strong  ABDOMEN: soft, " "nontender, no hepatosplenomegaly, no masses and bowel sounds normal  MS: no gross musculoskeletal defects noted, no edema  SKIN: no suspicious lesions or rashes  NEURO: Normal strength and tone, mentation intact and speech normal  PSYCH: mentation appears normal, affect normal/bright    Diagnostic Test Results:  Labs reviewed in Epic    ASSESSMENT / PLAN:       (Z00.00) Wellness examination  Comment: Overall the patient has good health habits and is in good health, she does have some stress to her 's medical condition  Plan:      (J45.30) Mild persistent asthma   Comment: Well-controlled  Plan:      (J30.1) Allergic rhinitis  Comment: Well-controlled and OTC medication  Plan:      (L40.9) Psoriasis  Comment: Followed by dermatology  Plan: Adult Dermatology Referral             (Z13.820) Screening for osteoporosis  Comment:    Plan: DEXA HIP/PELVIS/SPINE - Future             (Z13.220) Screening for lipid disorders  Comment:    Plan: Glucose, Lipid panel reflex to direct LDL         Fasting             PLAN:  1.  Patient is followed regularly by OB/GYN and ophthalmology as well as dermatology.  2.  I did place a referral for dermatology consultants  3.  DEXA scan  4.  Laboratory studies as above  5.  Patient otherwise should be seen yearly      Patient has been advised of split billing requirements and indicates understanding: Yes    COUNSELING:  Reviewed preventive health counseling, as reflected in patient instructions       Regular exercise       Healthy diet/nutrition    Estimated body mass index is 31.83 kg/m  as calculated from the following:    Height as of 8/20/21: 1.626 m (5' 4\").    Weight as of 8/25/21: 84.1 kg (185 lb 7 oz).    Weight management plan: Discussed healthy diet and exercise guidelines    She reports that she has quit smoking. Her smoking use included cigarettes and cigarettes. She has a 20.00 pack-year smoking history. She has never used smokeless tobacco.      Appropriate preventive " services were discussed with this patient, including applicable screening as appropriate for cardiovascular disease, diabetes, osteopenia/osteoporosis, and glaucoma.  As appropriate for age/gender, discussed screening for colorectal cancer, prostate cancer, breast cancer, and cervical cancer. Checklist reviewing preventive services available has been given to the patient.    Reviewed patients plan of care and provided an AVS. The Basic Care Plan (routine screening as documented in Health Maintenance) for Radha meets the Care Plan requirement. This Care Plan has been established and reviewed with the Patient.    Counseling Resources:  ATP IV Guidelines  Pooled Cohorts Equation Calculator  Breast Cancer Risk Calculator  Breast Cancer: Medication to Reduce Risk  FRAX Risk Assessment  ICSI Preventive Guidelines  Dietary Guidelines for Americans, 2010  USDA's MyPlate  ASA Prophylaxis  Lung CA Screening    Tahir Ruiz MD  [facility]    Identified Health Risks:

## 2022-04-18 ENCOUNTER — IMMUNIZATION (OUTPATIENT)
Dept: NURSING | Facility: CLINIC | Age: 67
End: 2022-04-18
Payer: COMMERCIAL

## 2022-04-18 PROCEDURE — 91305 COVID-19,PF,PFIZER (12+ YRS): CPT

## 2022-04-18 PROCEDURE — 0054A COVID-19,PF,PFIZER (12+ YRS): CPT

## 2022-04-21 DIAGNOSIS — Z88.9 MULTIPLE ALLERGIES: ICD-10-CM

## 2022-04-22 DIAGNOSIS — J45.20 MILD INTERMITTENT ASTHMA WITHOUT COMPLICATION: ICD-10-CM

## 2022-04-22 RX ORDER — FEXOFENADINE HCL AND PSEUDOEPHEDRINE HCI 60; 120 MG/1; MG/1
1 TABLET, EXTENDED RELEASE ORAL 2 TIMES DAILY
Qty: 180 TABLET | Refills: 0 | Status: SHIPPED | OUTPATIENT
Start: 2022-04-22 | End: 2022-10-03

## 2022-04-22 NOTE — TELEPHONE ENCOUNTER
Refill Request  Medication name: Pending Prescriptions:                       Disp   Refills    fexofenadine-pseudoePHEDrine (ALLEGRA-D A*180 ta*0            Sig: Take 1 tablet by mouth 2 times daily    Who prescribed the medication: VINNY  Last refill on medication: 11/17/2021  [facility]  Last appointment with PCP: 04/04/2022  Next appointment: Not due

## 2022-04-26 RX ORDER — ALBUTEROL SULFATE 90 UG/1
AEROSOL, METERED RESPIRATORY (INHALATION)
Qty: 18 G | Refills: 5 | Status: SHIPPED | OUTPATIENT
Start: 2022-04-26 | End: 2022-10-31

## 2022-04-26 NOTE — TELEPHONE ENCOUNTER
"Routing refill request to provider for review/approval because:  ACT score please review.     Last Written Prescription Date:  10/26/2021  Last Fill Quantity: 18 g,  # refills: 5   Last office visit provider:  2/18/2022     Requested Prescriptions   Pending Prescriptions Disp Refills     albuterol (PROAIR HFA/PROVENTIL HFA/VENTOLIN HFA) 108 (90 Base) MCG/ACT inhaler [Pharmacy Med Name: ALBUTEROL HFA INH(200 PUFFS)18GM] 18 g 5     Sig: INHALE 1 TO 2 PUFFS BY MOUTH EVERY 4 TO 6 HOURS AS NEEDED       Asthma Maintenance Inhalers - Anticholinergics Failed - 4/26/2022 11:09 AM        Failed - Asthma control assessment score within normal limits in last 6 months     Please review ACT score.           Passed - Patient is age 12 years or older        Passed - Medication is active on med list        Passed - Recent (6 mo) or future (30 days) visit within the authorizing provider's specialty     Patient had office visit in the last 6 months or has a visit in the next 30 days with authorizing provider or within the authorizing provider's specialty.  See \"Patient Info\" tab in inbasket, or \"Choose Columns\" in Meds & Orders section of the refill encounter.           Short-Acting Beta Agonist Inhalers Protocol  Failed - 4/26/2022 11:09 AM        Failed - Asthma control assessment score within normal limits in last 6 months     Please review ACT score.           Passed - Patient is age 12 or older        Passed - Medication is active on med list        Passed - Recent (6 mo) or future (30 days) visit within the authorizing provider's specialty     Patient had office visit in the last 6 months or has a visit in the next 30 days with authorizing provider or within the authorizing provider's specialty.  See \"Patient Info\" tab in inbasket, or \"Choose Columns\" in Meds & Orders section of the refill encounter.                 Deysi Moya RN 04/26/22 11:09 AM  "

## 2022-04-27 ENCOUNTER — ANCILLARY PROCEDURE (OUTPATIENT)
Dept: BONE DENSITY | Facility: CLINIC | Age: 67
End: 2022-04-27
Attending: FAMILY MEDICINE
Payer: COMMERCIAL

## 2022-04-27 DIAGNOSIS — Z13.820 SCREENING FOR OSTEOPOROSIS: ICD-10-CM

## 2022-04-27 PROCEDURE — 77080 DXA BONE DENSITY AXIAL: CPT | Mod: TC | Performed by: RADIOLOGY

## 2022-05-10 DIAGNOSIS — Z91.09 ENVIRONMENTAL ALLERGIES: ICD-10-CM

## 2022-05-12 NOTE — TELEPHONE ENCOUNTER
"Routing refill request to provider for review/approval because:  ACT score out of range.    Last Written Prescription Date:  11/9/21  Last Fill Quantity: 90,  # refills: 1   Last office visit provider:  2/18/22     Requested Prescriptions   Pending Prescriptions Disp Refills     montelukast (SINGULAIR) 10 MG tablet [Pharmacy Med Name: MONTELUKAST 10MG TABLETS] 90 tablet 1     Sig: TAKE 1 TABLET(10 MG) BY MOUTH DAILY       Leukotriene Inhibitors Protocol Failed - 5/12/2022 10:55 AM        Failed - Asthma control assessment score within normal limits in last 6 months     Please review ACT score.           Passed - Patient is age 12 or older     If patient is under 16, ok to refill using age based dosing.           Passed - Medication is active on med list        Passed - Recent (6 mo) or future (30 days) visit within the authorizing provider's specialty     Patient had office visit in the last 6 months or has a visit in the next 30 days with authorizing provider or within the authorizing provider's specialty.  See \"Patient Info\" tab in inbasket, or \"Choose Columns\" in Meds & Orders section of the refill encounter.                 Collin Ba RN 05/12/22 10:55 AM    "

## 2022-05-13 RX ORDER — MONTELUKAST SODIUM 10 MG/1
TABLET ORAL
Qty: 90 TABLET | Refills: 1 | Status: SHIPPED | OUTPATIENT
Start: 2022-05-13 | End: 2022-11-07

## 2022-05-31 ENCOUNTER — NURSE TRIAGE (OUTPATIENT)
Dept: NURSING | Facility: CLINIC | Age: 67
End: 2022-05-31
Payer: COMMERCIAL

## 2022-05-31 ENCOUNTER — HOSPITAL ENCOUNTER (OUTPATIENT)
Dept: MAMMOGRAPHY | Facility: CLINIC | Age: 67
Discharge: HOME | End: 2022-05-31
Attending: FAMILY MEDICINE | Admitting: FAMILY MEDICINE
Payer: COMMERCIAL

## 2022-05-31 ENCOUNTER — TELEPHONE (OUTPATIENT)
Dept: FAMILY MEDICINE | Facility: CLINIC | Age: 67
End: 2022-05-31
Payer: COMMERCIAL

## 2022-05-31 DIAGNOSIS — H53.9 VISION CHANGES: Primary | ICD-10-CM

## 2022-05-31 DIAGNOSIS — Z12.31 VISIT FOR SCREENING MAMMOGRAM: ICD-10-CM

## 2022-05-31 PROCEDURE — 77067 SCR MAMMO BI INCL CAD: CPT

## 2022-05-31 NOTE — TELEPHONE ENCOUNTER
Reason for Call: Request for an order or referral: [facility] eye [facility]    Order or referral being requested: Referral for [facility] clinic.    Date needed: as soon as possible    Has the patient been seen by the PCP for this problem? unknown    Additional comments: PT called and believes she has a tear in her retina. PT is requesting PCP to place a referral for [facility] eye clinic.    Phone number Patient can be reached at:  Cell number on file:    Telephone Information:   Mobile [phone]       Best Time:  any    Can we leave a detailed message on this number?  YES    Call taken on 5/31/2022 at 8:39 AM by Piedad Laureano

## 2022-05-31 NOTE — TELEPHONE ENCOUNTER
Clinic Action Needed: Yes, referral needed.     [facility] Triage Call  Presenting Problem:    Pt calls again to inform that she was already seen at [facility].  She was told by eye doctor that vitreal gel was going away, no retinal detachment, no retinal tear.    Pt needing referral sent to eye clinic for insurance purposes.    Deloris Birmingham RN/[place] Nurse Advisor

## 2022-06-12 DIAGNOSIS — J45.20 MILD INTERMITTENT ASTHMA WITHOUT COMPLICATION: ICD-10-CM

## 2022-06-13 RX ORDER — ALBUTEROL SULFATE 90 UG/1
AEROSOL, METERED RESPIRATORY (INHALATION)
Qty: 18 G | Refills: 5 | OUTPATIENT
Start: 2022-06-13

## 2022-07-28 ENCOUNTER — TELEPHONE (OUTPATIENT)
Dept: FAMILY MEDICINE | Facility: CLINIC | Age: 67
End: 2022-07-28

## 2022-07-28 NOTE — TELEPHONE ENCOUNTER
Reason for Call:  Other call back    Detailed comments: hair is thinning - was thyroid checked with tests    Phone Number Patient can be reached at: Cell number on file:    Telephone Information:   Mobile [phone]       Best Time: anytime    Can we leave a detailed message on this number? YES    Call taken on 7/28/2022 at 10:29 AM by Christina Matthews

## 2022-08-04 ENCOUNTER — OFFICE VISIT (OUTPATIENT)
Dept: INTERNAL MEDICINE | Facility: CLINIC | Age: 67
End: 2022-08-04
Payer: COMMERCIAL

## 2022-08-04 VITALS
WEIGHT: 187.7 LBS | HEART RATE: 88 BPM | RESPIRATION RATE: 18 BRPM | DIASTOLIC BLOOD PRESSURE: 78 MMHG | BODY MASS INDEX: 32.73 KG/M2 | SYSTOLIC BLOOD PRESSURE: 120 MMHG

## 2022-08-04 DIAGNOSIS — F43.21 ADJUSTMENT DISORDER WITH DEPRESSED MOOD: Primary | ICD-10-CM

## 2022-08-04 DIAGNOSIS — L65.9 HAIR THINNING: ICD-10-CM

## 2022-08-04 LAB — TSH SERPL DL<=0.005 MIU/L-ACNC: 2.1 UIU/ML (ref 0.3–4.2)

## 2022-08-04 PROCEDURE — 99213 OFFICE O/P EST LOW 20 MIN: CPT | Performed by: NURSE PRACTITIONER

## 2022-08-04 PROCEDURE — 84443 ASSAY THYROID STIM HORMONE: CPT | Performed by: NURSE PRACTITIONER

## 2022-08-04 PROCEDURE — 36415 COLL VENOUS BLD VENIPUNCTURE: CPT | Performed by: NURSE PRACTITIONER

## 2022-08-04 ASSESSMENT — ANXIETY QUESTIONNAIRES
GAD7 TOTAL SCORE: 7
3. WORRYING TOO MUCH ABOUT DIFFERENT THINGS: SEVERAL DAYS
7. FEELING AFRAID AS IF SOMETHING AWFUL MIGHT HAPPEN: SEVERAL DAYS
8. IF YOU CHECKED OFF ANY PROBLEMS, HOW DIFFICULT HAVE THESE MADE IT FOR YOU TO DO YOUR WORK, TAKE CARE OF THINGS AT HOME, OR GET ALONG WITH OTHER PEOPLE?: SOMEWHAT DIFFICULT
1. FEELING NERVOUS, ANXIOUS, OR ON EDGE: SEVERAL DAYS
GAD7 TOTAL SCORE: 7
7. FEELING AFRAID AS IF SOMETHING AWFUL MIGHT HAPPEN: SEVERAL DAYS
5. BEING SO RESTLESS THAT IT IS HARD TO SIT STILL: SEVERAL DAYS
6. BECOMING EASILY ANNOYED OR IRRITABLE: SEVERAL DAYS
IF YOU CHECKED OFF ANY PROBLEMS ON THIS QUESTIONNAIRE, HOW DIFFICULT HAVE THESE PROBLEMS MADE IT FOR YOU TO DO YOUR WORK, TAKE CARE OF THINGS AT HOME, OR GET ALONG WITH OTHER PEOPLE: SOMEWHAT DIFFICULT
2. NOT BEING ABLE TO STOP OR CONTROL WORRYING: SEVERAL DAYS
GAD7 TOTAL SCORE: 7
4. TROUBLE RELAXING: SEVERAL DAYS

## 2022-08-04 ASSESSMENT — PATIENT HEALTH QUESTIONNAIRE - PHQ9
SUM OF ALL RESPONSES TO PHQ QUESTIONS 1-9: 8
SUM OF ALL RESPONSES TO PHQ QUESTIONS 1-9: 8
10. IF YOU CHECKED OFF ANY PROBLEMS, HOW DIFFICULT HAVE THESE PROBLEMS MADE IT FOR YOU TO DO YOUR WORK, TAKE CARE OF THINGS AT HOME, OR GET ALONG WITH OTHER PEOPLE: SOMEWHAT DIFFICULT

## 2022-08-04 NOTE — PROGRESS NOTES
Assessment & Plan     Adjustment disorder with depressed mood  Multiple family deaths over the last few months.  Her dog is a big emotional support for her but unfortunately her dog passed away as well.  She is interested in individualized psychotherapy  - Adult Mental Health  Referral; Future    Hair thinning  Dermatology recommended that she have her thyroid checked.  Her dermatologist also felt that her hair thinning is likely stress related  - TSH with free T4 reflex; Future    Patient Instructions   Referral to counseling/therapy placed today.    We will check thyroid labs.  Results will come through MyChart.    He may want to consider meditation for stress reduction      Ordering of each unique test  20 minutes spent on the date of the encounter doing chart review, history and exam, documentation and further activities per the note    Nilesh Scherer, CNP  [facility]    Raj Garcia is a 67 year old, presenting for the following health issues:  No chief complaint on file.      HPI     Recently saw dermatology for hair thinning.  Started on Men's Rogaine.  Has been dealing with quite a bit amount of stress recently interested in therapy.  Does not necessarily want to start antidepressants right now      Review of Systems   Constitutional, HEENT, cardiovascular, pulmonary, gi and gu systems are negative, except as otherwise noted.      Objective    /78   Pulse 88   Resp 18   Wt 85.1 kg (187 lb 11.2 oz)   BMI 32.73 kg/m    Body mass index is 32.73 kg/m .  Physical Exam   Anxious appearing.  Tearful at times.    .  ..

## 2022-08-04 NOTE — PATIENT INSTRUCTIONS
Referral to counseling/therapy placed today.    We will check thyroid labs.  [facility] will [facility] through [facility].    He may want to consider meditation for stress reduction

## 2022-10-01 ENCOUNTER — HEALTH MAINTENANCE LETTER (OUTPATIENT)
Age: 67
End: 2022-10-01

## 2022-10-28 ENCOUNTER — IMMUNIZATION (OUTPATIENT)
Dept: NURSING | Facility: CLINIC | Age: 67
End: 2022-10-28
Payer: COMMERCIAL

## 2022-10-28 PROCEDURE — 91312 COVID-19,PF,PFIZER BOOSTER BIVALENT: CPT

## 2022-10-28 PROCEDURE — 0124A COVID-19,PF,PFIZER BOOSTER BIVALENT: CPT

## 2022-10-30 DIAGNOSIS — J45.20 MILD INTERMITTENT ASTHMA WITHOUT COMPLICATION: ICD-10-CM

## 2022-10-31 RX ORDER — ALBUTEROL SULFATE 90 UG/1
AEROSOL, METERED RESPIRATORY (INHALATION)
Qty: 18 G | Refills: 5 | Status: SHIPPED | OUTPATIENT
Start: 2022-10-31 | End: 2023-05-03

## 2022-10-31 NOTE — TELEPHONE ENCOUNTER
"Routing refill request to provider for review/approval because:  act    Last Written Prescription Date:  4/26/22  Last Fill Quantity: 18,  # refills: 5   Last office visit provider:  8/4/22     Requested Prescriptions   Pending Prescriptions Disp Refills     albuterol (PROAIR HFA/PROVENTIL HFA/VENTOLIN HFA) 108 (90 Base) MCG/ACT inhaler [Pharmacy Med Name: ALBUTEROL HFA INH(200 PUFFS)18GM] 18 g 5     Sig: INHALE 1 TO 2 PUFFS BY MOUTH EVERY 4 TO 6 HOURS AS NEEDED       Asthma Maintenance Inhalers - Anticholinergics Failed - 10/30/2022  6:20 PM        Failed - Asthma control assessment score within normal limits in last 6 months     Please review ACT score.           Failed - Recent (6 mo) or future (30 days) visit within the authorizing provider's specialty     Patient had office visit in the last 6 months or has a visit in the next 30 days with authorizing provider or within the authorizing provider's specialty.  See \"Patient Info\" tab in inbasket, or \"Choose Columns\" in Meds & Orders section of the refill encounter.            Passed - Patient is age 12 years or older        Passed - Medication is active on med list       Short-Acting Beta Agonist Inhalers Protocol  Failed - 10/30/2022  6:20 PM        Failed - Asthma control assessment score within normal limits in last 6 months     Please review ACT score.           Failed - Recent (6 mo) or future (30 days) visit within the authorizing provider's specialty     Patient had office visit in the last 6 months or has a visit in the next 30 days with authorizing provider or within the authorizing provider's specialty.  See \"Patient Info\" tab in inbasket, or \"Choose Columns\" in Meds & Orders section of the refill encounter.            Passed - Patient is age 12 or older        Passed - Medication is active on med list             Ethel Ley RN 10/31/22 3:43 PM  "

## 2022-10-31 NOTE — TELEPHONE ENCOUNTER
Please Advise    Refill Approved    Rx renewed per Medication Renewal Policy. Medication was last renewed on 10/2/19.    Ethel Ley, [facility] Triage/Med Refill 10/16/2019     Requested Prescriptions   Pending Prescriptions Disp Refills     albuterol (PROAIR HFA;PROVENTIL HFA;VENTOLIN HFA) 90 mcg/actuation inhaler [Pharmacy Med Name: ALBUTEROL HFA INH (200 PUFFS) 18GM] 18 g 0     Sig: INHALE 1- 2 PUFFS BY MOUTH EVERY 4- 6 HOURS AS NEEDED       Albuterol/Levalbuterol Refill Protocol Passed - 10/14/2019  9:15 PM        Passed - PCP or prescribing provider visit in last year     Last office visit with prescriber/PCP: 10/2/2019 Tahir Ruiz MD OR same dept: 10/2/2019 Tahir Ruiz MD OR same specialty: 10/2/2019 Tahir Ruiz MD Last physical: 1/10/2018       Next appt within 3 mo: Visit date not found  Next physical within 3 mo: Visit date not found  Prescriber OR PCP: Tahir Ruiz MD  Last diagnosis associated with med order: 1. Mild persistent asthma without complication  - albuterol (PROAIR HFA;PROVENTIL HFA;VENTOLIN HFA) 90 mcg/actuation inhaler [Pharmacy Med Name: ALBUTEROL HFA INH (200 PUFFS) 18GM]; INHALE 1- 2 PUFFS BY MOUTH EVERY 4- 6 HOURS AS NEEDED  Dispense: 18 g; Refill: 0    If protocol passes may refill for 6 months if within 3 months of last provider visit (or a total of 9 months). If patient requesting >1 inhaler per month refill x 6 months and have patient make appointment with provider.

## 2022-11-05 DIAGNOSIS — Z91.09 ENVIRONMENTAL ALLERGIES: ICD-10-CM

## 2022-11-07 RX ORDER — MONTELUKAST SODIUM 10 MG/1
TABLET ORAL
Qty: 90 TABLET | Refills: 1 | Status: SHIPPED | OUTPATIENT
Start: 2022-11-07 | End: 2023-05-12

## 2023-01-17 ENCOUNTER — OFFICE VISIT (OUTPATIENT)
Dept: FAMILY MEDICINE | Facility: CLINIC | Age: 68
End: 2023-01-17
Attending: NURSE PRACTITIONER
Payer: COMMERCIAL

## 2023-01-17 VITALS
SYSTOLIC BLOOD PRESSURE: 122 MMHG | DIASTOLIC BLOOD PRESSURE: 82 MMHG | HEART RATE: 84 BPM | BODY MASS INDEX: 33.12 KG/M2 | RESPIRATION RATE: 19 BRPM | WEIGHT: 194 LBS | HEIGHT: 64 IN | TEMPERATURE: 97.8 F | OXYGEN SATURATION: 99 %

## 2023-01-17 DIAGNOSIS — Z23 NEED FOR VACCINATION: ICD-10-CM

## 2023-01-17 DIAGNOSIS — J30.1 ALLERGIC RHINITIS DUE TO POLLEN, UNSPECIFIED SEASONALITY: ICD-10-CM

## 2023-01-17 DIAGNOSIS — L65.9 HAIR LOSS: ICD-10-CM

## 2023-01-17 DIAGNOSIS — Z11.59 NEED FOR HEPATITIS C SCREENING TEST: ICD-10-CM

## 2023-01-17 DIAGNOSIS — L40.9 PSORIASIS: ICD-10-CM

## 2023-01-17 DIAGNOSIS — J45.30 MILD PERSISTENT ASTHMA WITHOUT COMPLICATION: Primary | ICD-10-CM

## 2023-01-17 PROCEDURE — 90677 PCV20 VACCINE IM: CPT | Performed by: FAMILY MEDICINE

## 2023-01-17 PROCEDURE — 96127 BRIEF EMOTIONAL/BEHAV ASSMT: CPT | Performed by: FAMILY MEDICINE

## 2023-01-17 PROCEDURE — 90471 IMMUNIZATION ADMIN: CPT | Performed by: FAMILY MEDICINE

## 2023-01-17 PROCEDURE — 99214 OFFICE O/P EST MOD 30 MIN: CPT | Mod: 25 | Performed by: FAMILY MEDICINE

## 2023-01-17 ASSESSMENT — ANXIETY QUESTIONNAIRES
8. IF YOU CHECKED OFF ANY PROBLEMS, HOW DIFFICULT HAVE THESE MADE IT FOR YOU TO DO YOUR WORK, TAKE CARE OF THINGS AT HOME, OR GET ALONG WITH OTHER PEOPLE?: SOMEWHAT DIFFICULT
4. TROUBLE RELAXING: SEVERAL DAYS
2. NOT BEING ABLE TO STOP OR CONTROL WORRYING: SEVERAL DAYS
7. FEELING AFRAID AS IF SOMETHING AWFUL MIGHT HAPPEN: SEVERAL DAYS
1. FEELING NERVOUS, ANXIOUS, OR ON EDGE: SEVERAL DAYS
IF YOU CHECKED OFF ANY PROBLEMS ON THIS QUESTIONNAIRE, HOW DIFFICULT HAVE THESE PROBLEMS MADE IT FOR YOU TO DO YOUR WORK, TAKE CARE OF THINGS AT HOME, OR GET ALONG WITH OTHER PEOPLE: SOMEWHAT DIFFICULT
GAD7 TOTAL SCORE: 7
GAD7 TOTAL SCORE: 7
3. WORRYING TOO MUCH ABOUT DIFFERENT THINGS: SEVERAL DAYS
7. FEELING AFRAID AS IF SOMETHING AWFUL MIGHT HAPPEN: SEVERAL DAYS
GAD7 TOTAL SCORE: 7
6. BECOMING EASILY ANNOYED OR IRRITABLE: SEVERAL DAYS
5. BEING SO RESTLESS THAT IT IS HARD TO SIT STILL: SEVERAL DAYS

## 2023-01-17 ASSESSMENT — ASTHMA QUESTIONNAIRES
QUESTION_4 LAST FOUR WEEKS HOW OFTEN HAVE YOU USED YOUR RESCUE INHALER OR NEBULIZER MEDICATION (SUCH AS ALBUTEROL): THREE OR MORE TIMES PER DAY
ACT_TOTALSCORE: 14
QUESTION_1 LAST FOUR WEEKS HOW MUCH OF THE TIME DID YOUR ASTHMA KEEP YOU FROM GETTING AS MUCH DONE AT WORK, SCHOOL OR AT HOME: A LITTLE OF THE TIME
QUESTION_3 LAST FOUR WEEKS HOW OFTEN DID YOUR ASTHMA SYMPTOMS (WHEEZING, COUGHING, SHORTNESS OF BREATH, CHEST TIGHTNESS OR PAIN) WAKE YOU UP AT NIGHT OR EARLIER THAN USUAL IN THE MORNING: ONCE OR TWICE
ACT_TOTALSCORE: 14
QUESTION_2 LAST FOUR WEEKS HOW OFTEN HAVE YOU HAD SHORTNESS OF BREATH: ONCE A DAY
QUESTION_5 LAST FOUR WEEKS HOW WOULD YOU RATE YOUR ASTHMA CONTROL: SOMEWHAT CONTROLLED

## 2023-01-17 NOTE — PROGRESS NOTES
"  Assessment & Plan     (J45.30) Mild persistent asthma  (primary encounter diagnosis)  Comment: Generally well controlled   Plan:      (L65.9) Hair loss  Comment: Followed by dermatology she is on medication which has been helpful  Plan:      (L40.9) Psoriasis  Comment: Also followed by dermatology  Plan:      (J30.1) Allergic rhinitis   Comment: Generally mild Flonase and OTC medications as  Plan:      (Z23) Need for vaccination  Comment:    Plan: Pneumococcal 20 Valent Conjugate (PCV20)             PLAN:  1.  Patient is followed regularly by dermatology  2.  Pneumonia 20 vaccine  3.  No other change in the patient's therapy or medications for now  4.  Patient is due for a physical in 6-month                   BMI:   Estimated body mass index is 33.83 kg/m  as calculated from the following:    Height as of this encounter: 1.613 m (5' 3.5\").    Weight as of this encounter: 88 kg (194 lb).           Return in about 6 months (around 7/17/2023) for Routine preventive, with me, in person.    Tahir Ruiz MD  [facility]   Radha is a 67 year old, presenting for the following health issues:    Patient comes in for follow-up of her comorbidities.  She has a history of asthma generally well controlled on the winter months tend to be a little bit more difficult for her, she has a controller inhaler which she does not use consistently she has a rescue inhaler that she uses more often.    She has a history of allergic rhinitis she has Flonase so she uses that more on occasion.    Patient has a history of psoriasis followed by dermatology but she also was experiencing significant hair loss also followed by dermatology and she is on Rogaine and some other medications which she has found is helpful.    Patient will be due for colonoscopy later in the year also should be getting the pneumonia booster shot today.              Pneumonia shot , Overall health, GI issues, and " "Asthma      History of Present Illness     Asthma:  She presents for follow up of asthma.  She has no cough, no wheezing, and some shortness of breath. She is using a relief medication daily. She does not miss any doses of her controller medication throughout the week.Patient is aware of the following triggers: same as previous visit, humidity and strong odors and fumes. The patient has not had a visit to the Emergency Room, Urgent Care or Hospital due to asthma since the last clinic visit.     Mental Health Follow-up:  Patient presents to follow-up on Depression & Anxiety.Patient's depression since last visit has been:  Medium  The patient is having other symptoms associated with depression.  Patient's anxiety since last visit has been:  Medium  The patient is having other symptoms associated with anxiety.  Any significant life events: grief or loss  Patient is not feeling anxious or having panic attacks.  Patient has no concerns about alcohol or drug use.    Vascular Disease:  She presents for follow up of vascular disease.  She never takes nitroglycerin. She takes daily aspirin.    She eats 2-3 servings of fruits and vegetables daily.She consumes 0 sweetened beverage(s) daily.She exercises with enough effort to increase her heart rate 10 to 19 minutes per day.  She exercises with enough effort to increase her heart rate 4 days per week.   She is taking medications regularly.    Today's PHQ-9         PHQ-9 Total Score: 8    PHQ-9 Q9 Thoughts of better off dead/self-harm past 2 weeks :   Not at all    How difficult have these problems made it for you to do your work, take care of things at home, or get along with other people: Somewhat difficult  Today's FELIZ-7 Score: 7             Review of Systems         Objective    /82   Pulse 84   Temp 97.8  F (36.6  C) (Temporal)   Resp 19   Ht 1.613 m (5' 3.5\")   Wt 88 kg (194 lb)   SpO2 99%   BMI 33.83 kg/m    Body mass index is 33.83 kg/m .  Physical Exam "

## 2023-02-02 NOTE — TELEPHONE ENCOUNTER
"Routing refill request to provider for review/approval because:  act    Last Written Prescription Date:  5/13/22  Last Fill Quantity: 90,  # refills: 1   Last office visit provider:  8/4/22     Requested Prescriptions   Pending Prescriptions Disp Refills     montelukast (SINGULAIR) 10 MG tablet [Pharmacy Med Name: MONTELUKAST 10MG TABLETS] 90 tablet 1     Sig: TAKE 1 TABLET(10 MG) BY MOUTH DAILY       Leukotriene Inhibitors Protocol Failed - 11/5/2022  9:40 AM        Failed - Asthma control assessment score within normal limits in last 6 months     Please review ACT score.           Failed - Recent (6 mo) or future (30 days) visit within the authorizing provider's specialty     Patient had office visit in the last 6 months or has a visit in the next 30 days with authorizing provider or within the authorizing provider's specialty.  See \"Patient Info\" tab in inbasket, or \"Choose Columns\" in Meds & Orders section of the refill encounter.            Passed - Patient is age 12 or older     If patient is under 16, ok to refill using age based dosing.           Passed - Medication is active on med list             Ethel Ley, RN 11/07/22 1:50 PM  "
1 pair

## 2023-02-22 ENCOUNTER — APPOINTMENT (OUTPATIENT)
Dept: ULTRASOUND IMAGING | Facility: CLINIC | Age: 68
End: 2023-02-22
Attending: EMERGENCY MEDICINE
Payer: COMMERCIAL

## 2023-02-22 ENCOUNTER — NURSE TRIAGE (OUTPATIENT)
Dept: NURSING | Facility: CLINIC | Age: 68
End: 2023-02-22
Payer: COMMERCIAL

## 2023-02-22 ENCOUNTER — HOSPITAL ENCOUNTER (EMERGENCY)
Facility: CLINIC | Age: 68
Discharge: HOME | End: 2023-02-22
Attending: EMERGENCY MEDICINE | Admitting: EMERGENCY MEDICINE
Payer: COMMERCIAL

## 2023-02-22 ENCOUNTER — APPOINTMENT (OUTPATIENT)
Dept: CT IMAGING | Facility: CLINIC | Age: 68
End: 2023-02-22
Attending: EMERGENCY MEDICINE
Payer: COMMERCIAL

## 2023-02-22 VITALS
RESPIRATION RATE: 20 BRPM | HEART RATE: 96 BPM | OXYGEN SATURATION: 96 % | TEMPERATURE: 97.7 F | HEIGHT: 63 IN | DIASTOLIC BLOOD PRESSURE: 63 MMHG | WEIGHT: 194 LBS | SYSTOLIC BLOOD PRESSURE: 139 MMHG | BODY MASS INDEX: 34.38 KG/M2

## 2023-02-22 DIAGNOSIS — R11.10 VOMITING AND DIARRHEA: ICD-10-CM

## 2023-02-22 DIAGNOSIS — N39.0 URINARY TRACT INFECTION WITH HEMATURIA, SITE UNSPECIFIED: ICD-10-CM

## 2023-02-22 DIAGNOSIS — R31.9 URINARY TRACT INFECTION WITH HEMATURIA, SITE UNSPECIFIED: ICD-10-CM

## 2023-02-22 DIAGNOSIS — R19.7 VOMITING AND DIARRHEA: ICD-10-CM

## 2023-02-22 LAB
ALBUMIN SERPL-MCNC: 4.1 G/DL (ref 3.5–5)
ALBUMIN UR-MCNC: NEGATIVE MG/DL
ALP SERPL-CCNC: 84 U/L (ref 45–120)
ALT SERPL W P-5'-P-CCNC: 21 U/L (ref 0–45)
ANION GAP SERPL CALCULATED.3IONS-SCNC: 7 MMOL/L (ref 5–18)
APPEARANCE UR: CLEAR
AST SERPL W P-5'-P-CCNC: 21 U/L (ref 0–40)
BILIRUB SERPL-MCNC: 1.3 MG/DL (ref 0–1)
BILIRUB UR QL STRIP: NEGATIVE
BUN SERPL-MCNC: 15 MG/DL (ref 8–22)
CALCIUM SERPL-MCNC: 9.5 MG/DL (ref 8.5–10.5)
CHLORIDE BLD-SCNC: 105 MMOL/L (ref 98–107)
CO2 SERPL-SCNC: 26 MMOL/L (ref 22–31)
COLOR UR AUTO: YELLOW
CREAT SERPL-MCNC: 0.7 MG/DL (ref 0.6–1.1)
ERYTHROCYTE [DISTWIDTH] IN BLOOD BY AUTOMATED COUNT: 12.9 % (ref 10–15)
GFR SERPL CREATININE-BSD FRML MDRD: >90 ML/MIN/1.73M2
GLUCOSE BLD-MCNC: 119 MG/DL (ref 70–125)
GLUCOSE UR STRIP-MCNC: NEGATIVE MG/DL
HCT VFR BLD AUTO: 45.9 % (ref 35–47)
HGB BLD-MCNC: 15.6 G/DL (ref 11.7–15.7)
HGB UR QL STRIP: NEGATIVE
HOLD SPECIMEN: NORMAL
HOLD SPECIMEN: NORMAL
KETONES UR STRIP-MCNC: ABNORMAL MG/DL
LACTATE SERPL-SCNC: 1 MMOL/L (ref 0.7–2)
LEUKOCYTE ESTERASE UR QL STRIP: ABNORMAL
LIPASE SERPL-CCNC: 40 U/L (ref 0–52)
MCH RBC QN AUTO: 33.9 PG (ref 26.5–33)
MCHC RBC AUTO-ENTMCNC: 34 G/DL (ref 31.5–36.5)
MCV RBC AUTO: 100 FL (ref 78–100)
MUCOUS THREADS #/AREA URNS LPF: PRESENT /LPF
NITRATE UR QL: NEGATIVE
PH UR STRIP: 5 [PH] (ref 5–7)
PLATELET # BLD AUTO: 282 10E3/UL (ref 150–450)
POTASSIUM BLD-SCNC: 4 MMOL/L (ref 3.5–5)
PROT SERPL-MCNC: 7.5 G/DL (ref 6–8)
RBC # BLD AUTO: 4.6 10E6/UL (ref 3.8–5.2)
RBC URINE: 9 /HPF
SODIUM SERPL-SCNC: 138 MMOL/L (ref 136–145)
SP GR UR STRIP: 1.03 (ref 1–1.03)
SQUAMOUS EPITHELIAL: 5 /HPF
UROBILINOGEN UR STRIP-MCNC: <2 MG/DL
WBC # BLD AUTO: 12 10E3/UL (ref 4–11)
WBC URINE: 38 /HPF

## 2023-02-22 PROCEDURE — 96365 THER/PROPH/DIAG IV INF INIT: CPT

## 2023-02-22 PROCEDURE — 87086 URINE CULTURE/COLONY COUNT: CPT | Performed by: EMERGENCY MEDICINE

## 2023-02-22 PROCEDURE — 99285 EMERGENCY DEPT VISIT HI MDM: CPT | Mod: 25

## 2023-02-22 PROCEDURE — 258N000003 HC RX IP 258 OP 636: Performed by: EMERGENCY MEDICINE

## 2023-02-22 PROCEDURE — 85027 COMPLETE CBC AUTOMATED: CPT | Performed by: EMERGENCY MEDICINE

## 2023-02-22 PROCEDURE — 250N000011 HC RX IP 250 OP 636: Performed by: EMERGENCY MEDICINE

## 2023-02-22 PROCEDURE — 83605 ASSAY OF LACTIC ACID: CPT | Performed by: EMERGENCY MEDICINE

## 2023-02-22 PROCEDURE — 74177 CT ABD & PELVIS W/CONTRAST: CPT

## 2023-02-22 PROCEDURE — 36415 COLL VENOUS BLD VENIPUNCTURE: CPT | Performed by: EMERGENCY MEDICINE

## 2023-02-22 PROCEDURE — 80053 COMPREHEN METABOLIC PANEL: CPT | Performed by: EMERGENCY MEDICINE

## 2023-02-22 PROCEDURE — 87506 IADNA-DNA/RNA PROBE TQ 6-11: CPT | Performed by: EMERGENCY MEDICINE

## 2023-02-22 PROCEDURE — 81001 URINALYSIS AUTO W/SCOPE: CPT | Performed by: EMERGENCY MEDICINE

## 2023-02-22 PROCEDURE — 76705 ECHO EXAM OF ABDOMEN: CPT

## 2023-02-22 PROCEDURE — 96375 TX/PRO/DX INJ NEW DRUG ADDON: CPT

## 2023-02-22 PROCEDURE — 83690 ASSAY OF LIPASE: CPT | Performed by: EMERGENCY MEDICINE

## 2023-02-22 PROCEDURE — 96361 HYDRATE IV INFUSION ADD-ON: CPT

## 2023-02-22 RX ORDER — ONDANSETRON 2 MG/ML
4 INJECTION INTRAMUSCULAR; INTRAVENOUS ONCE
Status: COMPLETED | OUTPATIENT
Start: 2023-02-22 | End: 2023-02-22

## 2023-02-22 RX ORDER — KETOROLAC TROMETHAMINE 15 MG/ML
15 INJECTION, SOLUTION INTRAMUSCULAR; INTRAVENOUS ONCE
Status: COMPLETED | OUTPATIENT
Start: 2023-02-22 | End: 2023-02-22

## 2023-02-22 RX ORDER — IOPAMIDOL 755 MG/ML
100 INJECTION, SOLUTION INTRAVASCULAR ONCE
Status: COMPLETED | OUTPATIENT
Start: 2023-02-22 | End: 2023-02-22

## 2023-02-22 RX ORDER — ONDANSETRON 4 MG/1
4 TABLET, ORALLY DISINTEGRATING ORAL EVERY 8 HOURS PRN
Qty: 20 TABLET | Refills: 0 | Status: SHIPPED | OUTPATIENT
Start: 2023-02-22 | End: 2023-12-22

## 2023-02-22 RX ORDER — CEFTRIAXONE 1 G/1
1 INJECTION, POWDER, FOR SOLUTION INTRAMUSCULAR; INTRAVENOUS ONCE
Status: COMPLETED | OUTPATIENT
Start: 2023-02-22 | End: 2023-02-22

## 2023-02-22 RX ORDER — CEFDINIR 300 MG/1
300 CAPSULE ORAL 2 TIMES DAILY
Qty: 20 CAPSULE | Refills: 0 | Status: SHIPPED | OUTPATIENT
Start: 2023-02-22 | End: 2023-09-27

## 2023-02-22 RX ADMIN — ONDANSETRON 4 MG: 2 INJECTION INTRAMUSCULAR; INTRAVENOUS at 16:07

## 2023-02-22 RX ADMIN — KETOROLAC TROMETHAMINE 15 MG: 15 INJECTION, SOLUTION INTRAMUSCULAR; INTRAVENOUS at 16:05

## 2023-02-22 RX ADMIN — IOPAMIDOL 100 ML: 755 INJECTION, SOLUTION INTRAVENOUS at 16:34

## 2023-02-22 RX ADMIN — CEFTRIAXONE 1 G: 1 INJECTION, POWDER, FOR SOLUTION INTRAMUSCULAR; INTRAVENOUS at 18:52

## 2023-02-22 RX ADMIN — SODIUM CHLORIDE 1000 ML: 9 INJECTION, SOLUTION INTRAVENOUS at 16:13

## 2023-02-22 ASSESSMENT — ACTIVITIES OF DAILY LIVING (ADL)
ADLS_ACUITY_SCORE: 35
ADLS_ACUITY_SCORE: 33
ADLS_ACUITY_SCORE: 35

## 2023-02-22 NOTE — ED PROVIDER NOTES
EMERGENCY DEPARTMENT ENCOUNTER      NAME: Radha Cooper  AGE: 67 year old female  YOB: 1955  MRN: [id number]  EVALUATION DATE & TIME: 2/22/2023  2:52 PM    PCP: Tahir Ruiz    ED PROVIDER: Thiago De La Garza MD        Chief Complaint   Patient presents with     Abdominal Pain     Nausea, Vomiting, & Diarrhea         FINAL IMPRESSION:  1. Vomiting and diarrhea    2. Urinary tract infection with hematuria, site unspecified          ED COURSE & MEDICAL DECISION MAKING:    Pertinent Labs & Imaging studies reviewed. (See chart for details)  67 year old female presents to the Emergency Department for evaluation of diarrhea, vomiting, right-sided flank pain since this morning.  Chills but no fever.   had similar illness over the weekend.  Other contacts had similar symptoms a week ago.  No blood in stool.    On exam tenderness mostly right upper quadrant region.  Has had hysterectomy.    Differential includes colitis, cholecystitis, renal colic, pyelonephritis, AAA, appendicitis, enteritis, pancreatitis, biliary colic, pancreatitis, mesenteric ischemia    Pulmonary emboli, pneumonia, ACS unlikely    Plan for UA, CMP, lipase, lactic acid, CBC, stool cultures, Toradol, Zofran, CT abdomen pelvis with contrast    ED Course as of 02/22/23 1827 Wed Feb 22, 2023   1715 UA is suggestive of UTI with white blood cells and leukocytes.   1716 WBC(!): 12.0  CT abdomen ordered to evaluate for infected kidney stone, appendicitis, colitis   1716 Lipase: 40   1716 Bilirubin Total(!): 1.3   1716 AST: 21   1716 ALT: 21   1716 Alkaline Phosphatase: 84  Choledocholithiasis unlikely   1717 Given IV fluids, Toradol, Zofran   1731 Patient reports rash with penicillin but no urticaria or angioedema.  Ceftriaxone ordered for likely UTI.  Pain is markedly improved.  Will get right upper quadrant ultrasound since she does have some persistent right upper quadrant tenderness   1824 Based on urine   1825 Pains has almost  completely resolved with Toradol.   1825 CT with no explanation of patient's symptoms.  Regarding ultrasound negative for finding suggestive of cholecystitis.  Rectal quadrant pain likely from developing pyelonephritis or spasming from diarrhea   1826 We will give IV ceftriaxone, IV fluids, patient no longer vomiting.  Feels much better.  Is well-appearing will send home with Omnicef for 10 days, Zofran, and recommend Imodium over-the-counter     5:28 PM Rechecked and updated patient.    Medical Decision Making    History:    Supplemental history from: Documented in chart, if applicable    External Record(s) reviewed: Documented in chart, if applicable.    Work Up:    Chart documentation includes differential considered and any EKGs or imaging independently interpreted by provider, where specified.    In additional to work up documented, I considered the following work up: Documented in chart, if applicable.    External consultation:    Discussion of management with another provider: Documented in chart, if applicable    Complicating factors:    Care impacted by chronic illness: N/A    Care affected by social determinants of health: N/A    Disposition considerations: Discharge. I prescribed additional prescription strength medication(s) as charted. I considered admission, but ultimately discharged patient After improvement in symptoms and tolerating oral intake.            At the conclusion of the encounter I discussed the results of all of the tests and the disposition. The questions were answered. The patient or family acknowledged understanding and was agreeable with the care plan.           MEDICATIONS GIVEN IN THE EMERGENCY:  Medications   cefTRIAXone (ROCEPHIN) 1 g vial to attach to  mL bag for ADULTS or NS 50 mL bag for PEDS (has no administration in time range)   ketorolac (TORADOL) injection 15 mg (15 mg Intravenous $Given 2/22/23 5443)   ondansetron (ZOFRAN) injection 4 mg (4 mg Intravenous $Given  "2/22/23 1607)   0.9% sodium chloride BOLUS (0 mLs Intravenous Stopped 2/22/23 1705)   iopamidol (ISOVUE-370) solution 100 mL (100 mLs Intravenous $Given 2/22/23 1634)       NEW PRESCRIPTIONS STARTED AT TODAY'S ER VISIT  New Prescriptions    CEFDINIR (OMNICEF) 300 MG CAPSULE    Take 1 capsule (300 mg) by mouth 2 times daily    ONDANSETRON (ZOFRAN ODT) 4 MG ODT TAB    Take 1 tablet (4 mg) by mouth every 8 hours as needed for nausea          =================================================================    HPI    Triage note  \"Pt here with vomiting and diarrhea since 0500 today.  had stomach flu a few days ago.       \"      Patient information was obtained from: patient        Radha Cooper is a 67 year old female with a pertinent history of asthma and psoriasis and renal colic who presents to this ED for evaluation of right-sided abdominal pain and vomiting and diarrhea    Symptoms started at 5 AM this morning.  Reports 30 loose stools.  Reports pain is right-sided and does not radiate.  8 out of 10.  Pain does wrap around to her back.  Denies dysuria.  No blood in stool.  Vomited 1 time.  Has not done any Tylenol or ibuprofen.  Has had a hysterectomy.  Has had kidney stones in the past.  So has her appendix and gallbladder.   had fever and diarrhea and vomiting over the weekend.      REVIEW OF SYSTEMS   Review of Systems   See HPI documentation.    PAST MEDICAL HISTORY:  Past Medical History:   Diagnosis Date     Shingles     Right leg, Right low back       PAST SURGICAL HISTORY:  Past Surgical History:   Procedure Laterality Date     ANKLE SURGERY Right     [id number]     CATARACT EXTRACTION Right 01/01/2015     HYSTERECTOMY  01/01/2010    Supracervical Hysterectomy Laparoscopic. With BSO.     OOPHORECTOMY Bilateral 01/01/2010     TONSILLECTOMY      Description: Tonsillectomy;  Recorded: 04/13/2009;     TUBAL LIGATION      Description: Tubal Ligation;  Recorded: 04/13/2009;           CURRENT " MEDICATIONS:    cefdinir (OMNICEF) 300 MG capsule  ondansetron (ZOFRAN ODT) 4 MG ODT tab  albuterol (PROAIR HFA/PROVENTIL HFA/VENTOLIN HFA) 108 (90 Base) MCG/ACT inhaler  ALLEGRA-D ALLERGY [employer] CONGESTION  MG 12 hr tablet  calcipotriene (DOVONOX) 0.005 % cream  clobetasol (TEMOVATE) 0.05 % cream  crisaborole (EUCRISA) 2 % Oint  fluticasone propionate (FLONASE) 50 mcg/actuation nasal spray  fluticasone propionate (FLOVENT HFA) 44 mcg/actuation inhaler  montelukast (SINGULAIR) 10 MG tablet  VIVELLE-DOT 0.05 mg/24 hr        ALLERGIES:  Allergies   Allergen Reactions     Penicillins Rash     rash--in childhood       Shellfish Containing Products [Shellfish-Derived Products] Other (See Comments)     Abdominal pain         FAMILY HISTORY:  Family History   Problem Relation Age of Onset     Coronary Artery Disease Mother 62     CABG Mother         80s     Hypertension Mother      Pacemaker Mother      Asthma Father      Asthma Sister      Hypertension Sister      Asthma Sister      Hypertension Sister      Lupus Sister      Ovarian Cancer Maternal Aunt 60        Mother's half sister     Breast Cancer Cousin 40       SOCIAL HISTORY:   Social History     Socioeconomic History     Marital status:      Number of children: 0   Occupational History     Occupation: [employer]     Employer: [employer]   Tobacco Use     Smoking status: Former     Packs/day: 1.00     Years: 20.00     Pack years: 20.00     Types: Cigarettes     Smokeless tobacco: Never     Tobacco comments:     Quit mid-90s   Substance and Sexual Activity     Alcohol use: Yes     Alcohol/week: 14.0 standard drinks     Types: 14 Glasses of wine per week     Drug use: No     Sexual activity: Yes     Partners: Male   Social History Narrative    Diet- Regular, fish, chicken.            Exercise- treadmill, walk daily, body classes             -meningioma, emotional changes       VITALS:  /65   Pulse 88   Temp 97.7  F (36.5  C)    "Resp 20   Ht 1.6 m (5' 3\")   Wt 88 kg (194 lb)   SpO2 96%   BMI 34.37 kg/m      PHYSICAL EXAM      Vitals: /65   Pulse 88   Temp 97.7  F (36.5  C)   Resp 20   Ht 1.6 m (5' 3\")   Wt 88 kg (194 lb)   SpO2 96%   BMI 34.37 kg/m    General: Appears uncomfortable, awake, alert, interactive.  Eyes: Conjunctivae non-injected. Sclera anicteric.  HENT: Atraumatic.  Neck: Supple.  Respiratory/Chest: Respiration unlabored.  Heart: Regular rate and rhythm, 2+ pulses  Abdomen: non distended, no guarding or rigidity, negative Padgett's, no right lower quadrant tenderness.  Most of tenderness is right upper quadrant.  No CVA tenderness  Musculoskeletal: Normal extremities. No edema or erythema.  Skin: Normal color. No rash or diaphoresis.  Neurologic: Face symmetric, moves all extremities spontaneously. Speech clear.  Psychiatric: Oriented to person, place, and time. Affect appropriate.       LAB:  All pertinent labs reviewed and interpreted.  Results for orders placed or performed during the hospital encounter of 02/22/23   CT Abdomen Pelvis w Contrast    Impression    IMPRESSION:   1.  No visualized explanation for patient's symptoms.   Abdomen US, limited (RUQ only)    Impression    IMPRESSION:  1.  Hepatic steatosis.  2.  Normal gallbladder and bile ducts. No cholelithiasis and no evidence of acute cholecystitis.       CBC (+ platelets, no diff)   Result Value Ref Range    WBC Count 12.0 (H) 4.0 - 11.0 10e3/uL    RBC Count 4.60 3.80 - 5.20 10e6/uL    Hemoglobin 15.6 11.7 - 15.7 g/dL    Hematocrit 45.9 35.0 - 47.0 %     78 - 100 fL    MCH 33.9 (H) 26.5 - 33.0 pg    MCHC 34.0 31.5 - 36.5 g/dL    RDW 12.9 10.0 - 15.0 %    Platelet Count 282 150 - 450 10e3/uL   Comprehensive metabolic panel   Result Value Ref Range    Sodium 138 136 - 145 mmol/L    Potassium 4.0 3.5 - 5.0 mmol/L    Chloride 105 98 - 107 mmol/L    Carbon Dioxide (CO2) 26 22 - 31 mmol/L    Anion Gap 7 5 - 18 mmol/L    Urea Nitrogen 15 8 - 22 " mg/dL    Creatinine 0.70 0.60 - 1.10 mg/dL    Calcium 9.5 8.5 - 10.5 mg/dL    Glucose 119 70 - 125 mg/dL    Alkaline Phosphatase 84 45 - 120 U/L    AST 21 0 - 40 U/L    ALT 21 0 - 45 U/L    Protein Total 7.5 6.0 - 8.0 g/dL    Albumin 4.1 3.5 - 5.0 g/dL    Bilirubin Total 1.3 (H) 0.0 - 1.0 mg/dL    GFR Estimate >90 >60 mL/min/1.73m2   Result Value Ref Range    Lipase 40 0 - 52 U/L   UA with Microscopic reflex to Culture    Specimen: Urine, Midstream   Result Value Ref Range    Color Urine Yellow Colorless, Straw, Light Yellow, Yellow    Appearance Urine Clear Clear    Glucose Urine Negative Negative mg/dL    Bilirubin Urine Negative Negative    Ketones Urine Trace (A) Negative mg/dL    Specific Gravity Urine 1.030 1.001 - 1.030    Blood Urine Negative Negative    pH Urine 5.0 5.0 - 7.0    Protein Albumin Urine Negative Negative mg/dL    Urobilinogen Urine <2.0 <2.0 mg/dL    Nitrite Urine Negative Negative    Leukocyte Esterase Urine 250 Tatum/uL (A) Negative    Mucus Urine Present (A) None Seen /LPF    RBC Urine 9 (H) <=2 /HPF    WBC Urine 38 (H) <=5 /HPF    Squamous Epithelials Urine 5 (H) <=1 /HPF   Lactic acid whole blood   Result Value Ref Range    Lactic Acid 1.0 0.7 - 2.0 mmol/L   Extra Blue Top Tube   Result Value Ref Range    Hold Specimen JIC    Extra Red Top Tube   Result Value Ref Range    Hold Specimen JIC        RADIOLOGY:  Reviewed all pertinent imaging. Please see official radiology report.  Abdomen US, limited (RUQ only)   Final Result   IMPRESSION:   1.  Hepatic steatosis.   2.  Normal gallbladder and bile ducts. No cholelithiasis and no evidence of acute cholecystitis.            CT Abdomen Pelvis w Contrast   Final Result   IMPRESSION:    1.  No visualized explanation for patient's symptoms.                  I, Idalia Haney, am serving as a scribe to document services personally performed by Jose M De La Garza MD based on my observation and the provider's statements to me. I, Dr. Jose M De La Garza,  attest that Idalia Haney is acting in a scribe capacity, has observed my performance of the services and has documented them in accordance with my direction.    Jose M De La Garza MD  Emergency Medicine  [facility] EMERGENCY ROOM  [address] [facility] [address]  [phone]       Jose M De La Garza MD  02/22/23 0557

## 2023-02-22 NOTE — TELEPHONE ENCOUNTER
Nurse Triage SBAR     Situation:  calling with CTC on file  regarding pain and diarrhea that pt woke up with today     Background: Woke up with pain on her right side, under her breast and down her ribs and side. She is having very frequent episodes of diarrhea as well and is unable to eat or drink anything     Assessment:  states she has had 12 episodes of diarrhea so far today. Pt was able to speak for a brief period of time and she states she has had diarrhea 30 times so far today. She tried drinking water but threw it up right away with phlegm. Rates side pain 8-9/10 and she has not been able to take anything for pain as she cannot even keep water down     Recommendation:  advised to go to ED now per protocol. Applicable care advice given. They verbalize understanding and are agreeable to plan        Reason for Disposition    SEVERE abdominal pain (e.g., excruciating) and present > 1 hour    Additional Information    Negative: Shock suspected (e.g., cold/pale/clammy skin, too weak to stand, low BP, rapid pulse)    Negative: Difficult to awaken or acting confused (e.g., disoriented, slurred speech)    Negative: Sounds like a life-threatening emergency to the triager    Negative: Vomiting also present and worse than the diarrhea    Negative: Blood in stool and without diarrhea    Protocols used: DIARRHEA-A-OH      Bre Carcamo RN [facility] February 22, 2023 2:07 PM

## 2023-02-23 LAB
C COLI+JEJUNI+LARI FUSA STL QL NAA+PROBE: NOT DETECTED
EC STX1 GENE STL QL NAA+PROBE: NOT DETECTED
EC STX2 GENE STL QL NAA+PROBE: NOT DETECTED
NOROV GI+II ORF1-ORF2 JNC STL QL NAA+PR: DETECTED
RVA NSP5 STL QL NAA+PROBE: NOT DETECTED
SALMONELLA SP RPOD STL QL NAA+PROBE: NOT DETECTED
SHIGELLA SP+EIEC IPAH STL QL NAA+PROBE: NOT DETECTED
V CHOL+PARA RFBL+TRKH+TNAA STL QL NAA+PR: NOT DETECTED
Y ENTERO RECN STL QL NAA+PROBE: NOT DETECTED

## 2023-02-23 NOTE — DISCHARGE INSTRUCTIONS
As we discussed your stool tests are pending.  We will call you if there is a abnormal result and needs follow-up.  Urine does look infected and is possible you are developing a early kidney infection therefore recommend Omnicef twice a day for 10 days.  Recommend Imodium over-the-counter for diarrhea.  Recommend Zofran every 8 hours as needed for vomiting.  Return to the ER for any worsening symptoms

## 2023-02-24 LAB — BACTERIA UR CULT: NORMAL

## 2023-02-27 ENCOUNTER — TELEPHONE (OUTPATIENT)
Dept: FAMILY MEDICINE | Facility: CLINIC | Age: 68
End: 2023-02-27
Payer: COMMERCIAL

## 2023-02-27 NOTE — TELEPHONE ENCOUNTER
Patient called to see if she can return to work after norovirus. RN recommended that she could return to work after 24 hours after symptoms have subsided. And continue to use good had hygiene.     REED Burr  [employer]

## 2023-05-01 DIAGNOSIS — J45.20 MILD INTERMITTENT ASTHMA WITHOUT COMPLICATION: ICD-10-CM

## 2023-05-02 NOTE — TELEPHONE ENCOUNTER
"Routing refill request to provider for review/approval because:  Failed Act score 14 on 1/17/2023    Last Written Prescription Date:  10/31/2022  Last Fill Quantity: 18 g,  # refills: 5   Last office visit provider:  1/17/2023     Requested Prescriptions   Pending Prescriptions Disp Refills     albuterol (PROAIR HFA/PROVENTIL HFA/VENTOLIN HFA) 108 (90 Base) MCG/ACT inhaler [Pharmacy Med Name: ALBUTEROL HFA INH(200 PUFFS) 18GM] 18 g 5     Sig: INHALE 1 TO 2 PUFFS BY MOUTH EVERY 4 TO 6 HOURS AS NEEDED       Asthma Maintenance Inhalers - Anticholinergics Failed - 5/2/2023  4:55 PM        Failed - Asthma control assessment score within normal limits in last 6 months     Please review ACT score.           Passed - Patient is age 12 years or older        Passed - Medication is active on med list        Passed - Recent (6 mo) or future (30 days) visit within the authorizing provider's specialty     Patient had office visit in the last 6 months or has a visit in the next 30 days with authorizing provider or within the authorizing provider's specialty.  See \"Patient Info\" tab in inbasket, or \"Choose Columns\" in Meds & Orders section of the refill encounter.           Short-Acting Beta Agonist Inhalers Protocol  Failed - 5/2/2023  4:55 PM        Failed - Asthma control assessment score within normal limits in last 6 months     Please review ACT score.           Passed - Patient is age 12 or older        Passed - Medication is active on med list        Passed - Recent (6 mo) or future (30 days) visit within the authorizing provider's specialty     Patient had office visit in the last 6 months or has a visit in the next 30 days with authorizing provider or within the authorizing provider's specialty.  See \"Patient Info\" tab in inbasket, or \"Choose Columns\" in Meds & Orders section of the refill encounter.                 Xiao Ferrer RN 05/02/23 4:56 PM  "

## 2023-05-03 RX ORDER — ALBUTEROL SULFATE 90 UG/1
AEROSOL, METERED RESPIRATORY (INHALATION)
Qty: 18 G | Refills: 5 | Status: SHIPPED | OUTPATIENT
Start: 2023-05-03 | End: 2023-09-27

## 2023-05-11 DIAGNOSIS — Z91.09 ENVIRONMENTAL ALLERGIES: ICD-10-CM

## 2023-05-12 RX ORDER — MONTELUKAST SODIUM 10 MG/1
TABLET ORAL
Qty: 90 TABLET | Refills: 2 | Status: SHIPPED | OUTPATIENT
Start: 2023-05-12 | End: 2023-09-27

## 2023-05-12 NOTE — TELEPHONE ENCOUNTER
"  Responded to.  Will close this encounter now.      Routing refill request to provider for review/approval because:      Do you wish to refill?  Labs out of range:  ACT 14 on 1/17/23    Last Written Prescription Date:  11/17/22  Last Fill Quantity: 90,  # refills: 1   Last office visit provider:  1/17/23     Requested Prescriptions   Pending Prescriptions Disp Refills     montelukast (SINGULAIR) 10 MG tablet [Pharmacy Med Name: MONTELUKAST 10MG TABLETS] 90 tablet 1     Sig: TAKE 1 TABLET(10 MG) BY MOUTH DAILY       Leukotriene Inhibitors Protocol Failed - 5/11/2023  9:00 AM        Failed - Asthma control assessment score within normal limits in last 6 months     Please review ACT score.           Passed - Patient is age 12 or older     If patient is under 16, ok to refill using age based dosing.           Passed - Medication is active on med list        Passed - Recent (6 mo) or future (30 days) visit within the authorizing provider's specialty     Patient had office visit in the last 6 months or has a visit in the next 30 days with authorizing provider or within the authorizing provider's specialty.  See \"Patient Info\" tab in inbasket, or \"Choose Columns\" in Meds & Orders section of the refill encounter.                 Baylee Piper RN 05/12/23 3:14 AM  "

## 2023-05-14 ENCOUNTER — HEALTH MAINTENANCE LETTER (OUTPATIENT)
Age: 68
End: 2023-05-14

## 2023-06-12 ENCOUNTER — HOSPITAL ENCOUNTER (OUTPATIENT)
Dept: MAMMOGRAPHY | Facility: CLINIC | Age: 68
Discharge: HOME | End: 2023-06-12
Attending: FAMILY MEDICINE | Admitting: FAMILY MEDICINE
Payer: COMMERCIAL

## 2023-06-12 DIAGNOSIS — Z12.31 VISIT FOR SCREENING MAMMOGRAM: ICD-10-CM

## 2023-06-12 PROCEDURE — 77067 SCR MAMMO BI INCL CAD: CPT

## 2023-09-19 DIAGNOSIS — Z88.9 MULTIPLE ALLERGIES: ICD-10-CM

## 2023-09-19 RX ORDER — FEXOFENADINE HYDROCHLORIDE AND PSEUDOEPHEDRINE HYDROCHLORIDE 60; 120 MG/1; MG/1
1 TABLET, FILM COATED, EXTENDED RELEASE ORAL 2 TIMES DAILY
Qty: 180 TABLET | Refills: 0 | Status: SHIPPED | OUTPATIENT
Start: 2023-09-19 | End: 2023-09-27

## 2023-09-27 ENCOUNTER — OFFICE VISIT (OUTPATIENT)
Dept: FAMILY MEDICINE | Facility: CLINIC | Age: 68
End: 2023-09-27
Payer: COMMERCIAL

## 2023-09-27 VITALS
HEART RATE: 77 BPM | HEIGHT: 63 IN | WEIGHT: 192 LBS | DIASTOLIC BLOOD PRESSURE: 78 MMHG | SYSTOLIC BLOOD PRESSURE: 130 MMHG | BODY MASS INDEX: 34.02 KG/M2 | TEMPERATURE: 97.3 F | OXYGEN SATURATION: 96 %

## 2023-09-27 DIAGNOSIS — H02.403 EYELID DROOPING DISEASE, BILATERAL: ICD-10-CM

## 2023-09-27 DIAGNOSIS — Z88.9 MULTIPLE ALLERGIES: ICD-10-CM

## 2023-09-27 DIAGNOSIS — Z13.220 SCREENING FOR LIPID DISORDERS: ICD-10-CM

## 2023-09-27 DIAGNOSIS — J45.20 MILD INTERMITTENT ASTHMA WITHOUT COMPLICATION: ICD-10-CM

## 2023-09-27 DIAGNOSIS — L40.9 PSORIASIS: ICD-10-CM

## 2023-09-27 DIAGNOSIS — J30.1 ALLERGIC RHINITIS DUE TO POLLEN, UNSPECIFIED SEASONALITY: ICD-10-CM

## 2023-09-27 DIAGNOSIS — J45.30 MILD PERSISTENT ASTHMA WITHOUT COMPLICATION: ICD-10-CM

## 2023-09-27 DIAGNOSIS — Z00.00 PHYSICAL EXAM: Primary | ICD-10-CM

## 2023-09-27 DIAGNOSIS — Z91.09 ENVIRONMENTAL ALLERGIES: ICD-10-CM

## 2023-09-27 PROCEDURE — 36415 COLL VENOUS BLD VENIPUNCTURE: CPT | Performed by: FAMILY MEDICINE

## 2023-09-27 PROCEDURE — 80048 BASIC METABOLIC PNL TOTAL CA: CPT | Performed by: FAMILY MEDICINE

## 2023-09-27 PROCEDURE — 99214 OFFICE O/P EST MOD 30 MIN: CPT | Mod: 25 | Performed by: FAMILY MEDICINE

## 2023-09-27 PROCEDURE — 80061 LIPID PANEL: CPT | Performed by: FAMILY MEDICINE

## 2023-09-27 PROCEDURE — 99397 PER PM REEVAL EST PAT 65+ YR: CPT | Performed by: FAMILY MEDICINE

## 2023-09-27 RX ORDER — FEXOFENADINE HCL AND PSEUDOEPHEDRINE HCI 60; 120 MG/1; MG/1
1 TABLET, EXTENDED RELEASE ORAL 2 TIMES DAILY
Qty: 180 TABLET | Refills: 0 | Status: SHIPPED | OUTPATIENT
Start: 2023-09-27 | End: 2024-01-15

## 2023-09-27 RX ORDER — ALBUTEROL SULFATE 90 UG/1
AEROSOL, METERED RESPIRATORY (INHALATION)
Qty: 18 G | Refills: 5 | Status: SHIPPED | OUTPATIENT
Start: 2023-09-27 | End: 2024-02-05

## 2023-09-27 RX ORDER — MONTELUKAST SODIUM 10 MG/1
10 TABLET ORAL DAILY
Qty: 90 TABLET | Refills: 3 | Status: SHIPPED | OUTPATIENT
Start: 2023-09-27

## 2023-09-27 ASSESSMENT — ENCOUNTER SYMPTOMS
ABDOMINAL PAIN: 0
ARTHRALGIAS: 1
DIZZINESS: 1
WEAKNESS: 0
EYE PAIN: 1
FEVER: 0
SHORTNESS OF BREATH: 1
CHILLS: 0
NERVOUS/ANXIOUS: 1
HEADACHES: 0
DIARRHEA: 1
HEMATOCHEZIA: 0
BREAST MASS: 0
MYALGIAS: 1
NAUSEA: 0
COUGH: 0
PARESTHESIAS: 1
HEMATURIA: 0
CONSTIPATION: 0
PALPITATIONS: 0
DYSURIA: 0
JOINT SWELLING: 1
FREQUENCY: 1
SORE THROAT: 0
HEARTBURN: 1

## 2023-09-27 ASSESSMENT — ASTHMA QUESTIONNAIRES: ACT_TOTALSCORE: 18

## 2023-09-27 ASSESSMENT — ACTIVITIES OF DAILY LIVING (ADL): CURRENT_FUNCTION: NO ASSISTANCE NEEDED

## 2023-09-27 NOTE — PROGRESS NOTES
"   SUBJECTIVE:   CC: Radha is an 68 year old who presents for preventive health visit.       9/27/2023     5:04 PM   Additional Questions   Roomed by Deisy FLOYD       HPI:  Patient comes in for annual physical examination.  The patient has a history of underlying asthma and allergic rhinitis she does need some of her medications renewed and these are generally very well controlled.    Patient has a history of psoriasis followed by dermatology she does need a referral placed for insurance purposes    The patient has upper eyelid drooping or sagging she is going to need blepharoplasty at some point but she also needs a referral to see her ophthalmologist.    The patient just got her flu shot she will get the COVID booster and RSV vaccine at her local pharmacy.    Patient readily admits that her exercise has been suboptimal she also drinks in excess of 7 alcoholic drinks weekly and I told her that that also might help lose weight if she could cut down.    Patient is otherwise up-to-date on other preventive maintenance issues.        Healthy Habits:     In general, how would you rate your overall health?  Good    Frequency of exercise:  2-3 days/week    Duration of exercise:  Less than 15 minutes    Do you usually eat at least 4 servings of fruit and vegetables a day, include whole grains    & fiber and avoid regularly eating high fat or \"junk\" foods?  No    Taking medications regularly:  Yes    Medication side effects:  Not applicable    Ability to successfully perform activities of daily living:  No assistance needed    Home Safety:  Throw rugs in the hallway and lack of grab bars in the bathroom    Hearing Impairment:  Difficulty understanding soft or whispered speech    In the past 6 months, have you been bothered by leaking of urine? Yes    In general, how would you rate your overall mental or emotional health?  Fair    Additional concerns today:  Yes      Today's PHQ-2 Score:       9/27/2023    12:13 PM "   PHQ-2 ( 1999 [employer])   Q1: Little interest or pleasure in doing things 1   Q2: Feeling down, depressed or hopeless 1   PHQ-2 Score 2   Q1: Little interest or pleasure in doing things Several days   Q2: Feeling down, depressed or hopeless Several days   PHQ-2 Score 2           Social History     Tobacco Use    Smoking status: Former     Packs/day: 1.00     Years: 20.00     Pack years: 20.00     Types: Cigarettes    Smokeless tobacco: Never    Tobacco comments:     Quit mid-90s   Substance Use Topics    Alcohol use: Yes     Alcohol/week: 14.0 standard drinks of alcohol     Types: 14 Glasses of wine per week             9/27/2023     4:53 PM   Alcohol Use   Prescreen: >3 drinks/day or >7 drinks/week? Yes   AUDIT SCORE  4     Reviewed orders with patient.  Reviewed health maintenance and updated orders accordingly - Yes  Lab work is in process    Breast Cancer Screening:    FHS-7:       2/15/2022     4:32 PM 5/31/2022     1:05 PM 6/12/2023     5:08 PM 9/27/2023    12:18 PM 9/27/2023     4:53 PM   Breast CA Risk Assessment (FHS-7)   Did any of your first-degree relatives have breast or ovarian cancer? No No No No No   Did any of your relatives have bilateral breast cancer? Yes No No Yes Yes   Did any man in your family have breast cancer? No No No No No   Did any woman in your family have breast and ovarian cancer? Yes No No Yes Yes   Did any woman in your family have breast cancer before age 50 y? Yes No No Yes Yes   Do you have 2 or more relatives with breast and/or ovarian cancer? Yes No No Yes Yes   Do you have 2 or more relatives with breast and/or bowel cancer? Yes No No Yes Yes       Mammogram Screening: Recommended mammography every 1-2 years with patient discussion and risk factor consideration  Pertinent mammograms are reviewed under the imaging tab.    History of abnormal Pap smear: Last 3 Pap Results: No results found for: PAP     Reviewed and updated as needed this visit by clinical staff   Tobacco   Allergies  Meds              Reviewed and updated as needed this visit by Provider                 Past Medical History:   Diagnosis Date    Shingles     Right leg, Right low back      Past Surgical History:   Procedure Laterality Date    ANKLE SURGERY Right     [id number]    CATARACT EXTRACTION Right 01/01/2015    HYSTERECTOMY  01/01/2010    Supracervical Hysterectomy Laparoscopic. With BSO.    OOPHORECTOMY Bilateral 01/01/2010    TONSILLECTOMY      Description: Tonsillectomy;  Recorded: 04/13/2009;    TUBAL LIGATION      Description: Tubal Ligation;  Recorded: 04/13/2009;       Review of Systems   Constitutional:  Negative for chills and fever.   HENT:  Positive for ear pain and hearing loss. Negative for congestion and sore throat.    Eyes:  Positive for pain and visual disturbance.   Respiratory:  Positive for shortness of breath. Negative for cough.    Cardiovascular:  Positive for peripheral edema. Negative for chest pain and palpitations.   Gastrointestinal:  Positive for diarrhea and heartburn. Negative for abdominal pain, constipation, hematochezia and nausea.   Breasts:  Negative for tenderness, breast mass and discharge.   Genitourinary:  Positive for frequency and urgency. Negative for dysuria, genital sores, hematuria, pelvic pain, vaginal bleeding and vaginal discharge.   Musculoskeletal:  Positive for arthralgias, joint swelling and myalgias.   Skin:  Negative for rash.   Neurological:  Positive for dizziness and paresthesias. Negative for weakness and headaches.   Psychiatric/Behavioral:  Positive for mood changes. The patient is nervous/anxious.      CONSTITUTIONAL: NEGATIVE for fever, chills, change in weight  INTEGUMENTARY/SKIN: NEGATIVE for worrisome rashes, moles or lesions  EYES: NEGATIVE for vision changes or irritation  ENT: NEGATIVE for ear, mouth and throat problems  RESP: NEGATIVE for significant cough or SOB  BREAST: NEGATIVE for masses, tenderness or discharge  CV: NEGATIVE for  "chest pain, palpitations or peripheral edema  GI: NEGATIVE for nausea, abdominal pain, heartburn, or change in bowel habits  : NEGATIVE for unusual urinary or vaginal symptoms. No vaginal bleeding.  MUSCULOSKELETAL: NEGATIVE for significant arthralgias or myalgia  NEURO: NEGATIVE for weakness, dizziness or paresthesias  PSYCHIATRIC: NEGATIVE for changes in mood or affect      OBJECTIVE:   /78   Pulse 77   Temp 97.3  F (36.3  C) (Temporal)   Ht 1.6 m (5' 3\")   Wt 87.1 kg (192 lb)   SpO2 96%   BMI 34.01 kg/m    Physical Exam  GENERAL: healthy, alert and no distress  EYES: Eyes grossly normal to inspection, PERRL and conjunctivae and sclerae normal  HENT: ear canals and TM's normal, nose and mouth without ulcers or lesions  NECK: no adenopathy, no asymmetry, masses, or scars and thyroid normal to palpation  RESP: lungs clear to auscultation - no rales, rhonchi or wheezes  BREAST: normal without masses, tenderness or nipple discharge and no palpable axillary masses or adenopathy  CV: regular rate and rhythm, normal S1 S2, no S3 or S4, no murmur, click or rub, no peripheral edema and peripheral pulses strong  ABDOMEN: soft, nontender, no hepatosplenomegaly, no masses and bowel sounds normal  MS: no gross musculoskeletal defects noted, no edema  SKIN: no suspicious lesions or rashes  NEURO: Normal strength and tone, mentation intact and speech normal  PSYCH: mentation appears normal, affect normal/bright    Diagnostic Test Results:  Labs reviewed in Epic    ASSESSMENT/PLAN:   (Z00.00) Physical exam  (primary encounter diagnosis)  Comment: Patient's health is relatively good though suboptimal exercise.  Plan:      (L40.9) Psoriasis  Comment: Followed by dermatology  Plan: Adult Dermatology Referral             (H02.403) Eyelid drooping disease, bilateral  Comment: Patient eventually will need blepharoplasty  Plan: Adult Eye  Referral             (J45.20) Mild intermittent asthma   Comment: " "Well-controlled  Plan: albuterol (PROAIR HFA/PROVENTIL HFA/VENTOLIN         HFA) 108 (90 Base) MCG/ACT inhaler             (Z88.9) Allergies  Comment: Multiple allergies OTC medications  Plan: fexofenadine-pseudoePHEDrine (ALLEGRA-D ALLERGY        & CONGESTION)  MG 12 hr tablet,         montelukast (SINGULAIR) 10 MG tablet             (Z13.220) Screening for lipid disorders  Comment:    Plan: Basic metabolic panel, Lipid panel reflex to         direct LDL Fasting             PLAN:  Medications renewed as above without changes  Laboratory studies as above done 5 hours postprandial  Referral to both [facility]  Patient at some point is going to have bilateral blepharoplasty  Patient is going to attempt to increase her level of physical activity and decrease alcohol consumption  Patient otherwise should be seen yearly          COUNSELING:  Reviewed preventive health counseling, as reflected in patient instructions       Regular exercise       Healthy diet/nutrition      BMI:   Estimated body mass index is 34.01 kg/m  as calculated from the following:    Height as of this encounter: 1.6 m (5' 3\").    Weight as of this encounter: 87.1 kg (192 lb).   Weight management plan: Discussed healthy diet and exercise guidelines      She reports that she has quit smoking. Her smoking use included cigarettes and cigarettes. She has a 20.00 pack-year smoking history. She has never used smokeless tobacco.          Tahir Ruiz MD  [facility]  "

## 2023-09-27 NOTE — LETTER
September 29, 2023      Radha Cooper  [address] [place] [address]        Dear ,    We are writing to inform you of your test results.Sugar is good at 94, you do not have diabetes.  Kidney tests are normal.  Cholesterol is just slightly elevated, but focus on good diet and exercise to help lower.    See us again in one year.          Resulted Orders   Basic metabolic panel   Result Value Ref Range    Sodium 138 135 - 145 mmol/L      Comment:      Reference intervals for this test were updated on 09/26/2023 to more accurately reflect our healthy population. There may be differences in the flagging of prior results with similar values performed with this method. Interpretation of those prior results can be made in the context of the updated reference intervals.     Potassium 4.4 3.4 - 5.3 mmol/L    Chloride 103 98 - 107 mmol/L    Carbon Dioxide (CO2) 26 22 - 29 mmol/L    Anion Gap 9 7 - 15 mmol/L    Urea Nitrogen 14.3 8.0 - 23.0 mg/dL    Creatinine 0.68 0.51 - 0.95 mg/dL    GFR Estimate >90 >60 mL/min/1.73m2    Calcium 9.7 8.8 - 10.2 mg/dL    Glucose 94 70 - 99 mg/dL   Lipid panel reflex to direct LDL Fasting   Result Value Ref Range    Cholesterol 222 (H) <200 mg/dL    Triglycerides 100 <150 mg/dL    Direct Measure HDL 72 >=50 mg/dL    LDL Cholesterol Calculated 130 (H) <=100 mg/dL    Non HDL Cholesterol 150 (H) <130 mg/dL    Narrative    Cholesterol  Desirable:  <200 mg/dL    Triglycerides  Normal:  Less than 150 mg/dL  Borderline High:  150-199 mg/dL  High:  200-499 mg/dL  Very High:  Greater than or equal to 500 mg/dL    Direct Measure HDL  Female:  Greater than or equal to 50 mg/dL   Male:  Greater than or equal to 40 mg/dL    LDL Cholesterol  Desirable:  <100mg/dL  Above Desirable:  100-129 mg/dL   Borderline High:  130-159 mg/dL   High:  160-189 mg/dL   Very High:  >= 190 mg/dL    Non HDL Cholesterol  Desirable:  130 mg/dL  Above Desirable:  130-159 mg/dL  Borderline High:  160-189 mg/dL  High:   190-219 mg/dL  Very High:  Greater than or equal to 220 mg/dL       If you have any questions or concerns, please call the clinic at the number listed above.       Sincerely,      Tahir Ruiz MD

## 2023-09-28 LAB
ANION GAP SERPL CALCULATED.3IONS-SCNC: 9 MMOL/L (ref 7–15)
BUN SERPL-MCNC: 14.3 MG/DL (ref 8–23)
CALCIUM SERPL-MCNC: 9.7 MG/DL (ref 8.8–10.2)
CHLORIDE SERPL-SCNC: 103 MMOL/L (ref 98–107)
CHOLEST SERPL-MCNC: 222 MG/DL
CREAT SERPL-MCNC: 0.68 MG/DL (ref 0.51–0.95)
EGFRCR SERPLBLD CKD-EPI 2021: >90 ML/MIN/1.73M2
GLUCOSE SERPL-MCNC: 94 MG/DL (ref 70–99)
HCO3 SERPL-SCNC: 26 MMOL/L (ref 22–29)
HDLC SERPL-MCNC: 72 MG/DL
LDLC SERPL CALC-MCNC: 130 MG/DL
NONHDLC SERPL-MCNC: 150 MG/DL
POTASSIUM SERPL-SCNC: 4.4 MMOL/L (ref 3.4–5.3)
SODIUM SERPL-SCNC: 138 MMOL/L (ref 135–145)
TRIGL SERPL-MCNC: 100 MG/DL

## 2023-11-02 ENCOUNTER — TRANSFERRED RECORDS (OUTPATIENT)
Dept: HEALTH INFORMATION MANAGEMENT | Facility: CLINIC | Age: 68
End: 2023-11-02
Payer: COMMERCIAL

## 2023-12-15 ENCOUNTER — OFFICE VISIT (OUTPATIENT)
Dept: FAMILY MEDICINE | Facility: CLINIC | Age: 68
End: 2023-12-15
Payer: COMMERCIAL

## 2023-12-15 VITALS
RESPIRATION RATE: 16 BRPM | HEART RATE: 88 BPM | DIASTOLIC BLOOD PRESSURE: 77 MMHG | BODY MASS INDEX: 34.37 KG/M2 | SYSTOLIC BLOOD PRESSURE: 145 MMHG | TEMPERATURE: 98.5 F | OXYGEN SATURATION: 96 % | WEIGHT: 194 LBS

## 2023-12-15 DIAGNOSIS — J02.9 SORE THROAT: Primary | ICD-10-CM

## 2023-12-15 DIAGNOSIS — J06.9 VIRAL URI: ICD-10-CM

## 2023-12-15 LAB
DEPRECATED S PYO AG THROAT QL EIA: NEGATIVE
FLUAV AG SPEC QL IA: NEGATIVE
FLUBV AG SPEC QL IA: NEGATIVE
GROUP A STREP BY PCR: NOT DETECTED

## 2023-12-15 PROCEDURE — 87651 STREP A DNA AMP PROBE: CPT | Performed by: PHYSICIAN ASSISTANT

## 2023-12-15 PROCEDURE — 87804 INFLUENZA ASSAY W/OPTIC: CPT | Performed by: PHYSICIAN ASSISTANT

## 2023-12-15 PROCEDURE — 99213 OFFICE O/P EST LOW 20 MIN: CPT | Performed by: PHYSICIAN ASSISTANT

## 2023-12-15 NOTE — PATIENT INSTRUCTIONS
Cough    You were seen today for a cough. This is likely due to a virus and will improve over the next 1-2 weeks on its own.    Symptom management:  - Drink plenty of non-caffeinated fluids  - Avoid smoke exposure  - May use tylenol or ibuprofen for discomfort  - Drink a warm non-caffeinated tea with honey  - Place a warm humidifier in your bedroom at night  - [employer]'s VaporRub    Reasons to return for re-evaluation:  - Develop a fever 100.4 or higher, current fever worsens, or fever does not improve in 72 hours  - Difficulty breathing or shortness of breath  - Cough continues to worsen including coughing up blood or coughing up thick, colored phlegm  - Unable to tolerate fluids    Otherwise, if symptoms have not improved in 7 days, follow-up with your primary care provider.

## 2023-12-15 NOTE — PROGRESS NOTES
Assessment & Plan:      Problem List Items Addressed This Visit    None  Visit Diagnoses       Sore throat    -  Primary    Relevant Orders    Streptococcus A Rapid Screen w/Reflex to PCR - Clinic Collect (Completed)    Group A Streptococcus PCR Throat Swab    Viral URI        Relevant Orders    Influenza A & B Antigen - Clinic Collect (Completed)          Medical Decision Making  Patient presents with sore throat and cough for 2 days.  Rapid strep and influenza are negative at this time.  Suspect likely other viral upper respiratory infection.  No signs concerning for pneumonia today.  Continue with conservative measures.  Discussed treatment and symptomatic care.  Allergies and medication interactions reviewed.  Discussed signs of worsening symptoms and when to follow-up with PCP if no symptom improvement.     Subjective:      Radha Cooper is a 68 year old female here for evaluation of sore throat and cough.  Onset of symptoms was 2 days ago.  Patient denies fevers and shortness of breath.  Patient had a negative COVID-19 test at home.  She states that she is very prone for pneumonia.     The following portions of the patient's history were reviewed and updated as appropriate: allergies, current medications, and problem list.     Review of Systems  Pertinent items are noted in HPI.    Allergies  Allergies   Allergen Reactions    Penicillins Rash     rash--in childhood    Shellfish-Derived Products Other (See Comments)     Abdominal pain       Family History   Problem Relation Age of Onset    Coronary Artery Disease Mother 62    CABG Mother         80s    Hypertension Mother     Pacemaker Mother     Asthma Father     Asthma Sister     Hypertension Sister     Asthma Sister     Hypertension Sister     Lupus Sister     Ovarian Cancer Maternal Aunt 60        Mother's half sister    Breast Cancer Cousin 40       Social History     Tobacco Use    Smoking status: Former     Packs/day: 1.00     Years: 20.00      Additional pack years: 0.00     Total pack years: 20.00     Types: Cigarettes     Quit date:      Years since quittin.9    Smokeless tobacco: Never    Tobacco comments:     Quit mid-   Substance Use Topics    Alcohol use: Yes     Alcohol/week: 14.0 standard drinks of alcohol     Types: 14 Glasses of wine per week        Objective:      BP (!) 145/77   Pulse 88   Temp 98.5  F (36.9  C) (Oral)   Resp 16   Wt 88 kg (194 lb)   SpO2 96%   BMI 34.37 kg/m    General appearance - alert, well appearing, and in no distress and non-toxic  Ears - bilateral TM's and external ear canals normal  Nose - normal and patent, no erythema, discharge or polyps  Mouth - mucous membranes moist, pharynx normal without lesions  Neck - supple, no significant adenopathy  Chest - clear to auscultation, no wheezes, rales or rhonchi, symmetric air entry  Heart - normal rate, regular rhythm, normal S1, S2, no murmurs, rubs, clicks or gallops     Lab & Imaging Results    Results for orders placed or performed in visit on 12/15/23   Streptococcus A Rapid Screen w/Reflex to PCR - Clinic Collect     Status: Normal    Specimen: Throat; Swab   Result Value Ref Range    Group A Strep antigen Negative Negative   Influenza A & B Antigen - Clinic Collect     Status: Normal    Specimen: Nose; Swab   Result Value Ref Range    Influenza A antigen Negative Negative    Influenza B antigen Negative Negative    Narrative    Test results must be correlated with clinical data. If necessary, results should be confirmed by a molecular assay or viral culture.       I personally reviewed these results and discussed findings with the patient.    The use of Dragon/Playlogic dictation services was used to construct the content of this note; any grammatical errors are non-intentional. Please contact the author directly if you are in need of any clarification.

## 2023-12-21 ASSESSMENT — ENCOUNTER SYMPTOMS: COUGH: 1

## 2023-12-21 NOTE — PROGRESS NOTES
Assessment & Plan     Moderate persistent asthma with exacerbation  New problem. Prescription for Z-tania and Prednisone due to green sputum changes, symptoms not improving after 10 days with frequent night time symptoms.  - azithromycin (ZITHROMAX) 250 MG tablet; Take 2 tablets (500 mg) by mouth daily for 1 day, THEN 1 tablet (250 mg) daily for 4 days.  - predniSONE (DELTASONE) 20 MG tablet; Take 2 tablets (40 mg) by mouth daily for 5 days                 Dano Sears MD  [facility]    Raj Garcia is a 68 year old, presenting for the following health issues:  Cough (Seen 12/15 at , states she has gotten worse since then. Coughing up dark green phlegm. Very deep cough, hard for her to catch her breath. )      History of Present Illness       Reason for visit:  Day 9 of stuffy head, cough, chest congestion  Symptom onset:  1-2 weeks ago  Symptoms include:  Cough, stuffy head, chest congestion  Symptom intensity:  Severe  Symptom progression:  Improving  Had these symptoms before:  Yes  Has tried/received treatment for these symptoms:  Yes  Previous treatment was successful:  Yes  Prior treatment description:  Treatments for pneumonia, pleurisy, bronchitis, asthma  What makes it worse:  Exertion, cold air  What makes it better:  Inhaler, nebulizerShe consumes 0 sweetened beverage(s) daily.She exercises with enough effort to increase her heart rate 10 to 19 minutes per day.  She exercises with enough effort to increase her heart rate 3 or less days per week.   She is taking medications regularly.     Asthma exacerbation: Started 10 days ago, with cough, sore throat, wheezing. She is using albuterol 5-6 times per day, also using Robitussin and Vicks. Over the last 2 couple days she has had green sputum.  She is waking 2-5 times per night to use albuterol inhaler.  Went to urgent care 1 week ago and had negative COVID-19, RSV, Flu, Strep.    Review of Systems   Respiratory:   "Positive for cough.             Objective    BP (!) 148/82 (BP Location: Left arm, Patient Position: Sitting, Cuff Size: Adult Regular)   Pulse 101   Temp 98.2  F (36.8  C) (Oral)   Ht 1.6 m (5' 3\")   Wt 88 kg (193 lb 14.4 oz)   SpO2 95%   BMI 34.35 kg/m    Body mass index is 34.35 kg/m .  Physical Exam   GENERAL: healthy, alert and no distress  HENT: normal cephalic/atraumatic, nose and mouth without ulcers or lesions, oropharynx clear, and oral mucous membranes moist  RESP: expiratory wheezes throughout  CV: regular rate and rhythm, normal S1 S2, no S3 or S4, no murmur, click or rub, no peripheral edema and peripheral pulses strong                      "

## 2023-12-22 ENCOUNTER — OFFICE VISIT (OUTPATIENT)
Dept: FAMILY MEDICINE | Facility: CLINIC | Age: 68
End: 2023-12-22
Payer: COMMERCIAL

## 2023-12-22 VITALS
HEIGHT: 63 IN | HEART RATE: 101 BPM | DIASTOLIC BLOOD PRESSURE: 82 MMHG | OXYGEN SATURATION: 95 % | TEMPERATURE: 98.2 F | BODY MASS INDEX: 34.36 KG/M2 | SYSTOLIC BLOOD PRESSURE: 148 MMHG | WEIGHT: 193.9 LBS

## 2023-12-22 DIAGNOSIS — J45.41 MODERATE PERSISTENT ASTHMA WITH EXACERBATION: Primary | ICD-10-CM

## 2023-12-22 PROCEDURE — 99214 OFFICE O/P EST MOD 30 MIN: CPT | Performed by: FAMILY MEDICINE

## 2023-12-22 RX ORDER — AZITHROMYCIN 250 MG/1
TABLET, FILM COATED ORAL
Qty: 6 TABLET | Refills: 0 | Status: SHIPPED | OUTPATIENT
Start: 2023-12-22 | End: 2023-12-27

## 2023-12-22 RX ORDER — PREDNISONE 20 MG/1
40 TABLET ORAL DAILY
Qty: 10 TABLET | Refills: 0 | Status: SHIPPED | OUTPATIENT
Start: 2023-12-22 | End: 2023-12-27

## 2023-12-22 ASSESSMENT — ASTHMA QUESTIONNAIRES: ACT_TOTALSCORE: 8

## 2024-01-13 DIAGNOSIS — Z88.9 MULTIPLE ALLERGIES: ICD-10-CM

## 2024-01-15 RX ORDER — FEXOFENADINE HYDROCHLORIDE AND PSEUDOEPHEDRINE HYDROCHLORIDE 60; 120 MG/1; MG/1
1 TABLET, FILM COATED, EXTENDED RELEASE ORAL 2 TIMES DAILY
Qty: 180 TABLET | Refills: 0 | Status: SHIPPED | OUTPATIENT
Start: 2024-01-15 | End: 2024-02-12

## 2024-02-03 DIAGNOSIS — J45.20 MILD INTERMITTENT ASTHMA WITHOUT COMPLICATION: ICD-10-CM

## 2024-02-05 RX ORDER — ALBUTEROL SULFATE 90 UG/1
AEROSOL, METERED RESPIRATORY (INHALATION)
Qty: 90 G | Refills: 3 | Status: SHIPPED | OUTPATIENT
Start: 2024-02-05

## 2024-02-10 DIAGNOSIS — Z88.9 MULTIPLE ALLERGIES: ICD-10-CM

## 2024-02-12 RX ORDER — FEXOFENADINE HYDROCHLORIDE AND PSEUDOEPHEDRINE HYDROCHLORIDE 60; 120 MG/1; MG/1
1 TABLET, FILM COATED, EXTENDED RELEASE ORAL 2 TIMES DAILY
Qty: 180 TABLET | Refills: 0 | Status: SHIPPED | OUTPATIENT
Start: 2024-02-12 | End: 2024-08-30

## 2024-06-04 ENCOUNTER — TRANSFERRED RECORDS (OUTPATIENT)
Dept: HEALTH INFORMATION MANAGEMENT | Facility: CLINIC | Age: 69
End: 2024-06-04
Payer: COMMERCIAL

## 2024-06-18 ENCOUNTER — HOSPITAL ENCOUNTER (OUTPATIENT)
Dept: MAMMOGRAPHY | Facility: CLINIC | Age: 69
Discharge: HOME | End: 2024-06-18
Attending: FAMILY MEDICINE | Admitting: FAMILY MEDICINE
Payer: COMMERCIAL

## 2024-06-18 DIAGNOSIS — Z12.31 VISIT FOR SCREENING MAMMOGRAM: ICD-10-CM

## 2024-06-18 PROCEDURE — 77063 BREAST TOMOSYNTHESIS BI: CPT

## 2024-07-18 ENCOUNTER — NURSE TRIAGE (OUTPATIENT)
Dept: FAMILY MEDICINE | Facility: CLINIC | Age: 69
End: 2024-07-18
Payer: COMMERCIAL

## 2024-07-18 NOTE — TELEPHONE ENCOUNTER
S-(situation): Patient concerned with ocular migraine     B-(background): Hx of ocular migraine- last occurs several years ago.     A-(assessment): Around 9am left eye experience visual halo with jagged edges. This is now starting to subside. Denied headache, weakness, confusion, speech changes, pain, blurry vision, loss of vision. No new medications or vaccines.     R-(recommendations): Protocol noted that opthalmology would be most appropriate and should be seen today. Patient verbalized understanding and stated that's what she thought she would do before calling. Reviewed red flag symptoms and encouraged her to call with any questions or concerns.       Reason for Disposition   Flashes of light  (Exception: Brief from pressing on the eyeball.)    Additional Information   Negative: Complete loss of vision in one or both eyes   Negative: SEVERE eye pain   Negative: SEVERE headache   Negative: Double vision   Negative: Blurred vision or visual changes and present now and sudden onset or new (e.g., minutes, hours, days)  (Exception: Seeing floaters / black specks OR previously diagnosed migraine headaches with same symptoms.)   Negative: Patient sounds very sick or weak to the triager   Negative: Many floaters in the eye (Exception: Floater(s) are a chronic symptom and this is unchanged from patient's baseline pattern.)   Negative: Taking digoxin (e.g., Lanoxin, Digitek, Cardoxin, Lanoxicaps; Toloxin in [place]) and blurred vision, yellow vision, or yellow-green halos    Protocols used: Vision Loss or Change-A-OH

## 2024-08-09 ENCOUNTER — MYC MEDICAL ADVICE (OUTPATIENT)
Dept: FAMILY MEDICINE | Facility: CLINIC | Age: 69
End: 2024-08-09
Payer: COMMERCIAL

## 2024-08-09 NOTE — TELEPHONE ENCOUNTER
Patient was seen at [facility], provider will be sending over note, provider Farhana, that saw her, suggesting anti-anxiety medication.

## 2024-08-09 NOTE — TELEPHONE ENCOUNTER
Left voicemail for pt, requested call back. Any RN able to triage pt's symptoms.    Martha Rodríguez RN

## 2024-08-19 ENCOUNTER — OFFICE VISIT (OUTPATIENT)
Dept: FAMILY MEDICINE | Facility: CLINIC | Age: 69
End: 2024-08-19
Payer: COMMERCIAL

## 2024-08-19 VITALS
HEART RATE: 86 BPM | BODY MASS INDEX: 32 KG/M2 | RESPIRATION RATE: 18 BRPM | HEIGHT: 64 IN | SYSTOLIC BLOOD PRESSURE: 138 MMHG | DIASTOLIC BLOOD PRESSURE: 80 MMHG | OXYGEN SATURATION: 97 % | WEIGHT: 187.44 LBS

## 2024-08-19 DIAGNOSIS — F39 MOOD DISORDER (H): ICD-10-CM

## 2024-08-19 DIAGNOSIS — R19.7 DIARRHEA, UNSPECIFIED TYPE: Primary | ICD-10-CM

## 2024-08-19 PROBLEM — K62.9 ANORECTAL DISORDER: Status: ACTIVE | Noted: 2024-06-04

## 2024-08-19 PROBLEM — D12.3 BENIGN NEOPLASM OF TRANSVERSE COLON: Status: ACTIVE | Noted: 2018-12-07

## 2024-08-19 PROBLEM — K52.9 NONINFECTIOUS GASTROENTERITIS: Status: RESOLVED | Noted: 2024-06-04 | Resolved: 2024-08-19

## 2024-08-19 PROBLEM — K57.30 DIVERTICULAR DISEASE OF LARGE INTESTINE: Status: ACTIVE | Noted: 2018-12-05

## 2024-08-19 PROBLEM — K57.30 DIVERTICULAR DISEASE OF LARGE INTESTINE: Status: RESOLVED | Noted: 2018-12-05 | Resolved: 2024-08-19

## 2024-08-19 PROBLEM — K64.9 HEMORRHOIDS: Status: ACTIVE | Noted: 2018-12-05

## 2024-08-19 PROBLEM — K63.5 POLYP OF COLON: Status: ACTIVE | Noted: 2018-12-05

## 2024-08-19 PROBLEM — K63.5 POLYP OF COLON: Status: RESOLVED | Noted: 2018-12-05 | Resolved: 2024-08-19

## 2024-08-19 PROBLEM — K62.9 ANORECTAL DISORDER: Status: RESOLVED | Noted: 2024-06-04 | Resolved: 2024-08-19

## 2024-08-19 PROBLEM — K52.9 NONINFECTIOUS GASTROENTERITIS: Status: ACTIVE | Noted: 2024-06-04

## 2024-08-19 PROCEDURE — 99214 OFFICE O/P EST MOD 30 MIN: CPT | Performed by: FAMILY MEDICINE

## 2024-08-19 PROCEDURE — G2211 COMPLEX E/M VISIT ADD ON: HCPCS | Performed by: FAMILY MEDICINE

## 2024-08-19 RX ORDER — ESTRADIOL 0.05 MG/D
PATCH, EXTENDED RELEASE TRANSDERMAL
COMMUNITY
Start: 2024-06-10

## 2024-08-19 RX ORDER — ESTRADIOL 2 MG/1
1 TABLET ORAL DAILY
COMMUNITY
Start: 2024-06-04

## 2024-08-19 RX ORDER — PAROXETINE 10 MG/1
TABLET, FILM COATED ORAL
Qty: 30 TABLET | Refills: 1 | Status: SHIPPED | OUTPATIENT
Start: 2024-08-19

## 2024-08-19 ASSESSMENT — ASTHMA QUESTIONNAIRES
ACT_TOTALSCORE: 19
QUESTION_2 LAST FOUR WEEKS HOW OFTEN HAVE YOU HAD SHORTNESS OF BREATH: THREE TO SIX TIMES A WEEK
QUESTION_4 LAST FOUR WEEKS HOW OFTEN HAVE YOU USED YOUR RESCUE INHALER OR NEBULIZER MEDICATION (SUCH AS ALBUTEROL): ONE OR TWO TIMES PER DAY
ACT_TOTALSCORE: 19
QUESTION_3 LAST FOUR WEEKS HOW OFTEN DID YOUR ASTHMA SYMPTOMS (WHEEZING, COUGHING, SHORTNESS OF BREATH, CHEST TIGHTNESS OR PAIN) WAKE YOU UP AT NIGHT OR EARLIER THAN USUAL IN THE MORNING: NOT AT ALL
QUESTION_5 LAST FOUR WEEKS HOW WOULD YOU RATE YOUR ASTHMA CONTROL: WELL CONTROLLED
QUESTION_1 LAST FOUR WEEKS HOW MUCH OF THE TIME DID YOUR ASTHMA KEEP YOU FROM GETTING AS MUCH DONE AT WORK, SCHOOL OR AT HOME: NONE OF THE TIME
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
## 2024-08-30 DIAGNOSIS — Z88.9 MULTIPLE ALLERGIES: ICD-10-CM

## 2024-08-30 RX ORDER — FEXOFENADINE HYDROCHLORIDE AND PSEUDOEPHEDRINE HYDROCHLORIDE 60; 120 MG/1; MG/1
1 TABLET, FILM COATED, EXTENDED RELEASE ORAL 2 TIMES DAILY
Qty: 180 TABLET | Refills: 1 | Status: SHIPPED | OUTPATIENT
Start: 2024-08-30

## 2024-10-29 ENCOUNTER — MYC REFILL (OUTPATIENT)
Dept: FAMILY MEDICINE | Facility: CLINIC | Age: 69
End: 2024-10-29
Payer: COMMERCIAL

## 2024-10-29 DIAGNOSIS — F39 MOOD DISORDER (H): ICD-10-CM

## 2024-10-29 DIAGNOSIS — Z88.9 MULTIPLE ALLERGIES: ICD-10-CM

## 2024-10-29 RX ORDER — PAROXETINE 10 MG/1
TABLET, FILM COATED ORAL
Qty: 90 TABLET | Refills: 0 | Status: SHIPPED | OUTPATIENT
Start: 2024-10-29

## 2024-10-29 RX ORDER — MONTELUKAST SODIUM 10 MG/1
10 TABLET ORAL DAILY
Qty: 90 TABLET | Refills: 2 | Status: SHIPPED | OUTPATIENT
Start: 2024-10-29

## 2024-11-20 ASSESSMENT — ANXIETY QUESTIONNAIRES
GAD7 TOTAL SCORE: 6
3. WORRYING TOO MUCH ABOUT DIFFERENT THINGS: SEVERAL DAYS
8. IF YOU CHECKED OFF ANY PROBLEMS, HOW DIFFICULT HAVE THESE MADE IT FOR YOU TO DO YOUR WORK, TAKE CARE OF THINGS AT HOME, OR GET ALONG WITH OTHER PEOPLE?: SOMEWHAT DIFFICULT
5. BEING SO RESTLESS THAT IT IS HARD TO SIT STILL: SEVERAL DAYS
IF YOU CHECKED OFF ANY PROBLEMS ON THIS QUESTIONNAIRE, HOW DIFFICULT HAVE THESE PROBLEMS MADE IT FOR YOU TO DO YOUR WORK, TAKE CARE OF THINGS AT HOME, OR GET ALONG WITH OTHER PEOPLE: SOMEWHAT DIFFICULT
2. NOT BEING ABLE TO STOP OR CONTROL WORRYING: SEVERAL DAYS
6. BECOMING EASILY ANNOYED OR IRRITABLE: SEVERAL DAYS
1. FEELING NERVOUS, ANXIOUS, OR ON EDGE: SEVERAL DAYS
7. FEELING AFRAID AS IF SOMETHING AWFUL MIGHT HAPPEN: NOT AT ALL
7. FEELING AFRAID AS IF SOMETHING AWFUL MIGHT HAPPEN: NOT AT ALL
4. TROUBLE RELAXING: SEVERAL DAYS
GAD7 TOTAL SCORE: 6
GAD7 TOTAL SCORE: 6

## 2024-11-20 ASSESSMENT — ASTHMA QUESTIONNAIRES
QUESTION_5 LAST FOUR WEEKS HOW WOULD YOU RATE YOUR ASTHMA CONTROL: SOMEWHAT CONTROLLED
QUESTION_2 LAST FOUR WEEKS HOW OFTEN HAVE YOU HAD SHORTNESS OF BREATH: ONCE A DAY
QUESTION_4 LAST FOUR WEEKS HOW OFTEN HAVE YOU USED YOUR RESCUE INHALER OR NEBULIZER MEDICATION (SUCH AS ALBUTEROL): THREE OR MORE TIMES PER DAY
ACT_TOTALSCORE: 13
QUESTION_3 LAST FOUR WEEKS HOW OFTEN DID YOUR ASTHMA SYMPTOMS (WHEEZING, COUGHING, SHORTNESS OF BREATH, CHEST TIGHTNESS OR PAIN) WAKE YOU UP AT NIGHT OR EARLIER THAN USUAL IN THE MORNING: ONCE A WEEK
ACT_TOTALSCORE: 13
QUESTION_1 LAST FOUR WEEKS HOW MUCH OF THE TIME DID YOUR ASTHMA KEEP YOU FROM GETTING AS MUCH DONE AT WORK, SCHOOL OR AT HOME: A LITTLE OF THE TIME

## 2024-11-21 ENCOUNTER — OFFICE VISIT (OUTPATIENT)
Dept: FAMILY MEDICINE | Facility: CLINIC | Age: 69
End: 2024-11-21
Payer: COMMERCIAL

## 2024-11-21 VITALS
RESPIRATION RATE: 20 BRPM | DIASTOLIC BLOOD PRESSURE: 72 MMHG | HEIGHT: 63 IN | HEART RATE: 85 BPM | BODY MASS INDEX: 33.14 KG/M2 | OXYGEN SATURATION: 96 % | TEMPERATURE: 97.7 F | WEIGHT: 187.06 LBS | SYSTOLIC BLOOD PRESSURE: 130 MMHG

## 2024-11-21 DIAGNOSIS — L40.9 PSORIASIS: Primary | ICD-10-CM

## 2024-11-21 DIAGNOSIS — J45.20 MILD INTERMITTENT ASTHMA WITHOUT COMPLICATION: ICD-10-CM

## 2024-11-21 DIAGNOSIS — J45.30 MILD PERSISTENT ASTHMA WITHOUT COMPLICATION: ICD-10-CM

## 2024-11-21 DIAGNOSIS — F39 MOOD DISORDER (H): ICD-10-CM

## 2024-11-21 DIAGNOSIS — Z23 NEED FOR VACCINATION: ICD-10-CM

## 2024-11-21 RX ORDER — ALBUTEROL SULFATE 90 UG/1
INHALANT RESPIRATORY (INHALATION)
Qty: 90 G | Refills: 3 | Status: SHIPPED | OUTPATIENT
Start: 2024-11-21

## 2024-11-21 NOTE — PROGRESS NOTES
"  Assessment & Plan     (L40.9) Psoriasis  (primary encounter diagnosis)  Comment: Managed with steroid cream  Plan:      (J45.20) Mild intermittent asthma without complication  Comment: Overall well-controlled with inhalers  Plan: albuterol (PROAIR HFA/PROVENTIL HFA/VENTOLIN         HFA) 108 (90 Base) MCG/ACT inhaler             (F39) Mood disorder (H)  Comment: Patient is benefiting from Paxil  Plan: PRIMARY CARE FOLLOW-UP SCHEDULING             (Z23) Need for vaccination  Comment:    Plan: COVID-19 12+ (PFIZER)             PLAN:  1.  COVID-vaccine  2.  Albuterol inhaler renewed, also continue the patient on Paxil and her other medications.  3.  Patient is due for Medicare wellness exam in 6 months  4.  The longitudinal plan of care for the diagnosis(es)/condition(s) as documented were addressed during this visit. Due to the added complexity in care, I will continue to support Radha in the subsequent management and with ongoing continuity of care.            BMI  Estimated body mass index is 33.14 kg/m  as calculated from the following:    Height as of this encounter: 1.6 m (5' 3\").    Weight as of this encounter: 84.9 kg (187 lb 1 oz).             Raj Garcia is a 69 year old, presenting for the following health issues:  RECHECK (med follow up.light numbness in my hands intermittently, not fasting)        11/21/2024     7:31 AM   Additional Questions   Roomed by TING Shanks     History of Present Illness       Mental Health Follow-up:  Patient presents to follow-up on Depression & Anxiety.Patient's depression since last visit has been:  Medium  The patient is not having other symptoms associated with depression.  Patient's anxiety since last visit has been:  Medium  The patient is having other symptoms associated with anxiety.  Any significant life events: health concerns  Patient is not feeling anxious or having panic attacks.  Patient has no concerns about alcohol or drug use.    She eats 2-3 " "servings of fruits and vegetables daily.She consumes 1 sweetened beverage(s) daily.She exercises with enough effort to increase her heart rate 30 to 60 minutes per day.  She exercises with enough effort to increase her heart rate 7 days per week.   She is taking medications regularly.     Patient comes in for follow-up of several of her comorbidities.  Patient has a history of some underlying anxiety, back in August I started her on Paxil this seems to be helping of note she also was having a lot of diarrhea in part due to anxiety and stress and this is improved as well.  She is tolerating the medication well.    Patient has a history of asthma she needs her rescue inhaler renewed she has also had episodes of pneumonia and therefore I do recommend the COVID booster today otherwise she has had all her other needed vaccinations.    Patient has a history of psoriasis on steroid creams for this.    The patient just retired actually this has been a transition as she enjoyed work in the social aspect of it but she is trying to stay active and busy.                      Objective    /72 (BP Location: Right arm, Patient Position: Sitting, Cuff Size: Adult Regular)   Pulse 85   Temp 97.7  F (36.5  C) (Oral)   Resp 20   Ht 1.6 m (5' 3\")   Wt 84.9 kg (187 lb 1 oz)   SpO2 96%   BMI 33.14 kg/m    Body mass index is 33.14 kg/m .  Physical Exam               Signed Electronically by: Tahir Ruiz MD    "

## 2024-12-28 DIAGNOSIS — J45.20 MILD INTERMITTENT ASTHMA WITHOUT COMPLICATION: ICD-10-CM

## 2024-12-30 RX ORDER — ALBUTEROL SULFATE 90 UG/1
INHALANT RESPIRATORY (INHALATION)
Qty: 33.5 G | Refills: 1 | Status: SHIPPED | OUTPATIENT
Start: 2024-12-30

## 2025-02-03 DIAGNOSIS — F39 MOOD DISORDER: ICD-10-CM

## 2025-02-03 RX ORDER — PAROXETINE 10 MG/1
TABLET, FILM COATED ORAL
Qty: 90 TABLET | Refills: 2 | Status: SHIPPED | OUTPATIENT
Start: 2025-02-03

## 2025-03-21 DIAGNOSIS — Z88.9 MULTIPLE ALLERGIES: ICD-10-CM

## 2025-03-22 RX ORDER — FEXOFENADINE HYDROCHLORIDE AND PSEUDOEPHEDRINE HYDROCHLORIDE 60; 120 MG/1; MG/1
1 TABLET, FILM COATED, EXTENDED RELEASE ORAL 2 TIMES DAILY
Qty: 180 TABLET | Refills: 0 | Status: SHIPPED | OUTPATIENT
Start: 2025-03-22

## 2025-03-29 DIAGNOSIS — J45.20 MILD INTERMITTENT ASTHMA WITHOUT COMPLICATION: ICD-10-CM

## 2025-03-31 RX ORDER — ALBUTEROL SULFATE 90 UG/1
INHALANT RESPIRATORY (INHALATION)
Qty: 18 G | Refills: 0 | Status: SHIPPED | OUTPATIENT
Start: 2025-03-31

## 2025-05-12 ENCOUNTER — PATIENT OUTREACH (OUTPATIENT)
Dept: CARE COORDINATION | Facility: CLINIC | Age: 70
End: 2025-05-12
Payer: COMMERCIAL

## 2025-05-18 ENCOUNTER — HEALTH MAINTENANCE LETTER (OUTPATIENT)
Age: 70
End: 2025-05-18

## 2025-05-22 ENCOUNTER — TELEPHONE (OUTPATIENT)
Dept: FAMILY MEDICINE | Facility: CLINIC | Age: 70
End: 2025-05-22
Payer: COMMERCIAL

## 2025-05-22 NOTE — TELEPHONE ENCOUNTER
Patient Quality Outreach    Patient is due for the following:   Physical Annual Wellness Visit    Action(s) Taken:   Schedule a Annual Wellness Visit    Type of outreach:    Phone, left message for patient/parent to call back.    Questions for provider review:    None         Dianna Plata  Chart routed to Care Team.

## 2025-05-26 ENCOUNTER — PATIENT OUTREACH (OUTPATIENT)
Dept: CARE COORDINATION | Facility: CLINIC | Age: 70
End: 2025-05-26
Payer: COMMERCIAL

## 2025-05-29 DIAGNOSIS — J45.20 MILD INTERMITTENT ASTHMA WITHOUT COMPLICATION: ICD-10-CM

## 2025-05-29 RX ORDER — ALBUTEROL SULFATE 90 UG/1
INHALANT RESPIRATORY (INHALATION)
Qty: 18 G | Refills: 0 | Status: SHIPPED | OUTPATIENT
Start: 2025-05-29

## 2025-05-29 NOTE — TELEPHONE ENCOUNTER
Patient Quality Outreach    Patient is due for the following:   Physical Annual Wellness Visit    Action(s) Taken:   Schedule a Annual Wellness Visit    Type of outreach:    Phone, left message for patient/parent to call back. and Sent [employer] message.    Questions for provider review:    None         Dianna Plata  Chart routed to None.

## 2025-06-23 ENCOUNTER — OFFICE VISIT (OUTPATIENT)
Dept: FAMILY MEDICINE | Facility: CLINIC | Age: 70
End: 2025-06-23
Attending: FAMILY MEDICINE
Payer: COMMERCIAL

## 2025-06-23 VITALS
OXYGEN SATURATION: 96 % | DIASTOLIC BLOOD PRESSURE: 82 MMHG | RESPIRATION RATE: 12 BRPM | HEART RATE: 77 BPM | SYSTOLIC BLOOD PRESSURE: 142 MMHG | TEMPERATURE: 97.9 F | HEIGHT: 63 IN | WEIGHT: 187 LBS | BODY MASS INDEX: 33.13 KG/M2

## 2025-06-23 DIAGNOSIS — J45.50 SEVERE PERSISTENT ASTHMA, UNSPECIFIED WHETHER COMPLICATED (H): ICD-10-CM

## 2025-06-23 DIAGNOSIS — Z13.220 SCREENING FOR LIPID DISORDERS: ICD-10-CM

## 2025-06-23 DIAGNOSIS — Z00.00 WELLNESS EXAMINATION: Primary | ICD-10-CM

## 2025-06-23 DIAGNOSIS — F39 MOOD DISORDER: ICD-10-CM

## 2025-06-23 DIAGNOSIS — J45.30 MILD PERSISTENT ASTHMA WITHOUT COMPLICATION: ICD-10-CM

## 2025-06-23 DIAGNOSIS — J30.1 ALLERGIC RHINITIS DUE TO POLLEN, UNSPECIFIED SEASONALITY: ICD-10-CM

## 2025-06-23 PROBLEM — K64.9 HEMORRHOIDS: Status: RESOLVED | Noted: 2018-12-05 | Resolved: 2025-06-23

## 2025-06-23 PROBLEM — D12.3 BENIGN NEOPLASM OF TRANSVERSE COLON: Status: RESOLVED | Noted: 2018-12-07 | Resolved: 2025-06-23

## 2025-06-23 LAB
ANION GAP SERPL CALCULATED.3IONS-SCNC: 9 MMOL/L (ref 7–15)
BUN SERPL-MCNC: 13.6 MG/DL (ref 8–23)
CALCIUM SERPL-MCNC: 9.9 MG/DL (ref 8.8–10.4)
CHLORIDE SERPL-SCNC: 103 MMOL/L (ref 98–107)
CHOLEST SERPL-MCNC: 228 MG/DL
CREAT SERPL-MCNC: 0.73 MG/DL (ref 0.51–0.95)
EGFRCR SERPLBLD CKD-EPI 2021: 87 ML/MIN/1.73M2
FASTING STATUS PATIENT QL REPORTED: ABNORMAL
FASTING STATUS PATIENT QL REPORTED: NORMAL
GLUCOSE SERPL-MCNC: 90 MG/DL (ref 70–99)
HCO3 SERPL-SCNC: 27 MMOL/L (ref 22–29)
HDLC SERPL-MCNC: 87 MG/DL
LDLC SERPL CALC-MCNC: 123 MG/DL
NONHDLC SERPL-MCNC: 141 MG/DL
POTASSIUM SERPL-SCNC: 5 MMOL/L (ref 3.4–5.3)
SODIUM SERPL-SCNC: 139 MMOL/L (ref 135–145)
TRIGL SERPL-MCNC: 89 MG/DL

## 2025-06-23 PROCEDURE — 36415 COLL VENOUS BLD VENIPUNCTURE: CPT | Performed by: FAMILY MEDICINE

## 2025-06-23 PROCEDURE — 3079F DIAST BP 80-89 MM HG: CPT | Performed by: FAMILY MEDICINE

## 2025-06-23 PROCEDURE — 80048 BASIC METABOLIC PNL TOTAL CA: CPT | Performed by: FAMILY MEDICINE

## 2025-06-23 PROCEDURE — 80061 LIPID PANEL: CPT | Performed by: FAMILY MEDICINE

## 2025-06-23 PROCEDURE — G0439 PPPS, SUBSEQ VISIT: HCPCS | Performed by: FAMILY MEDICINE

## 2025-06-23 PROCEDURE — 3077F SYST BP >= 140 MM HG: CPT | Performed by: FAMILY MEDICINE

## 2025-06-23 PROCEDURE — 99214 OFFICE O/P EST MOD 30 MIN: CPT | Mod: 25 | Performed by: FAMILY MEDICINE

## 2025-06-23 PROCEDURE — G2211 COMPLEX E/M VISIT ADD ON: HCPCS | Performed by: FAMILY MEDICINE

## 2025-06-23 SDOH — HEALTH STABILITY: PHYSICAL HEALTH: ON AVERAGE, HOW MANY MINUTES DO YOU ENGAGE IN EXERCISE AT THIS LEVEL?: 30 MIN

## 2025-06-23 SDOH — HEALTH STABILITY: PHYSICAL HEALTH: ON AVERAGE, HOW MANY DAYS PER WEEK DO YOU ENGAGE IN MODERATE TO STRENUOUS EXERCISE (LIKE A BRISK WALK)?: 7 DAYS

## 2025-06-23 ASSESSMENT — ASTHMA QUESTIONNAIRES
ACT_TOTALSCORE: 16
QUESTION_2 LAST FOUR WEEKS HOW OFTEN HAVE YOU HAD SHORTNESS OF BREATH: THREE TO SIX TIMES A WEEK
QUESTION_3 LAST FOUR WEEKS HOW OFTEN DID YOUR ASTHMA SYMPTOMS (WHEEZING, COUGHING, SHORTNESS OF BREATH, CHEST TIGHTNESS OR PAIN) WAKE YOU UP AT NIGHT OR EARLIER THAN USUAL IN THE MORNING: NOT AT ALL
QUESTION_5 LAST FOUR WEEKS HOW WOULD YOU RATE YOUR ASTHMA CONTROL: SOMEWHAT CONTROLLED
QUESTION_4 LAST FOUR WEEKS HOW OFTEN HAVE YOU USED YOUR RESCUE INHALER OR NEBULIZER MEDICATION (SUCH AS ALBUTEROL): THREE OR MORE TIMES PER DAY
QUESTION_1 LAST FOUR WEEKS HOW MUCH OF THE TIME DID YOUR ASTHMA KEEP YOU FROM GETTING AS MUCH DONE AT WORK, SCHOOL OR AT HOME: A LITTLE OF THE TIME

## 2025-06-23 ASSESSMENT — SOCIAL DETERMINANTS OF HEALTH (SDOH): HOW OFTEN DO YOU GET TOGETHER WITH FRIENDS OR RELATIVES?: PATIENT DECLINED

## 2025-06-23 NOTE — PATIENT INSTRUCTIONS
Assessment & Plan  Mood disorder:  - Mood disorder noted, patient is on Paxil (paroxetine).    Mild persistent asthma without complication:  - Asthma is controlled well.    Severe persistent asthma, unspecified whether complicated:  - Asthma is controlled well.    Allergic rhinitis due to pollen:  - Allergic rhinitis due to pollen noted.
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
## 2025-06-24 ENCOUNTER — RESULTS FOLLOW-UP (OUTPATIENT)
Dept: FAMILY MEDICINE | Facility: CLINIC | Age: 70
End: 2025-06-24
Payer: COMMERCIAL

## 2025-08-11 DIAGNOSIS — Z88.9 MULTIPLE ALLERGIES: ICD-10-CM

## 2025-08-11 DIAGNOSIS — J45.20 MILD INTERMITTENT ASTHMA WITHOUT COMPLICATION: ICD-10-CM

## 2025-08-12 RX ORDER — MONTELUKAST SODIUM 10 MG/1
1 TABLET ORAL DAILY
Qty: 90 TABLET | Refills: 3 | Status: SHIPPED | OUTPATIENT
Start: 2025-08-12

## 2025-08-12 RX ORDER — ALBUTEROL SULFATE 90 UG/1
INHALANT RESPIRATORY (INHALATION)
Qty: 18 G | Refills: 0 | Status: SHIPPED | OUTPATIENT
Start: 2025-08-12